# Patient Record
Sex: MALE | Race: BLACK OR AFRICAN AMERICAN | Employment: UNEMPLOYED
[De-identification: names, ages, dates, MRNs, and addresses within clinical notes are randomized per-mention and may not be internally consistent; named-entity substitution may affect disease eponyms.]

---

## 2017-01-16 ENCOUNTER — TELEPHONE (OUTPATIENT)
Dept: PODIATRY | Facility: CLINIC | Age: 52
End: 2017-01-16

## 2017-04-16 ENCOUNTER — HOSPITAL ENCOUNTER (EMERGENCY)
Age: 52
Discharge: HOME | End: 2017-04-16
Attending: EMERGENCY MEDICINE
Payer: COMMERCIAL

## 2017-04-16 ENCOUNTER — APPOINTMENT (OUTPATIENT)
Dept: GENERAL RADIOLOGY | Age: 52
End: 2017-04-16
Payer: COMMERCIAL

## 2017-04-16 VITALS
BODY MASS INDEX: 27.5 KG/M2 | HEIGHT: 72 IN | HEART RATE: 75 BPM | OXYGEN SATURATION: 97 % | DIASTOLIC BLOOD PRESSURE: 82 MMHG | SYSTOLIC BLOOD PRESSURE: 128 MMHG | RESPIRATION RATE: 14 BRPM | WEIGHT: 203 LBS | TEMPERATURE: 97.9 F

## 2017-04-16 DIAGNOSIS — R07.9 CHEST PAIN, UNSPECIFIED TYPE: Primary | ICD-10-CM

## 2017-04-16 LAB
ABSOLUTE EOS #: 0.1 K/UL (ref 0–0.4)
ABSOLUTE LYMPH #: 2.1 K/UL (ref 1–4.8)
ABSOLUTE MONO #: 0.5 K/UL (ref 0.1–1.3)
ANION GAP SERPL CALCULATED.3IONS-SCNC: 11 MMOL/L (ref 9–17)
BASOPHILS # BLD: 1 % (ref 0–2)
BASOPHILS ABSOLUTE: 0.1 K/UL (ref 0–0.2)
BUN BLDV-MCNC: 23 MG/DL (ref 6–20)
BUN/CREAT BLD: ABNORMAL (ref 9–20)
CALCIUM SERPL-MCNC: 8.3 MG/DL (ref 8.6–10.4)
CHLORIDE BLD-SCNC: 104 MMOL/L (ref 98–107)
CO2: 26 MMOL/L (ref 20–31)
CREAT SERPL-MCNC: 0.94 MG/DL (ref 0.7–1.2)
DIFFERENTIAL TYPE: ABNORMAL
EOSINOPHILS RELATIVE PERCENT: 2 % (ref 0–4)
GFR AFRICAN AMERICAN: >60 ML/MIN
GFR NON-AFRICAN AMERICAN: >60 ML/MIN
GFR SERPL CREATININE-BSD FRML MDRD: ABNORMAL ML/MIN/{1.73_M2}
GFR SERPL CREATININE-BSD FRML MDRD: ABNORMAL ML/MIN/{1.73_M2}
GLUCOSE BLD-MCNC: 116 MG/DL (ref 70–99)
HCT VFR BLD CALC: 39.4 % (ref 41–53)
HEMOGLOBIN: 13.1 G/DL (ref 13.5–17.5)
INR BLD: 0.9
LYMPHOCYTES # BLD: 40 % (ref 24–44)
MCH RBC QN AUTO: 29.8 PG (ref 26–34)
MCHC RBC AUTO-ENTMCNC: 33.4 G/DL (ref 31–37)
MCV RBC AUTO: 89.3 FL (ref 80–100)
MONOCYTES # BLD: 10 % (ref 1–7)
PDW BLD-RTO: 14.7 % (ref 11.5–14.9)
PLATELET # BLD: 150 K/UL (ref 150–450)
PLATELET ESTIMATE: ABNORMAL
PMV BLD AUTO: 8.3 FL (ref 6–12)
POTASSIUM SERPL-SCNC: 3.7 MMOL/L (ref 3.7–5.3)
PROTHROMBIN TIME: 9.8 SEC (ref 9.7–12)
RBC # BLD: 4.41 M/UL (ref 4.5–5.9)
RBC # BLD: ABNORMAL 10*6/UL
SEG NEUTROPHILS: 47 % (ref 36–66)
SEGMENTED NEUTROPHILS ABSOLUTE COUNT: 2.5 K/UL (ref 1.3–9.1)
SODIUM BLD-SCNC: 141 MMOL/L (ref 135–144)
TROPONIN INTERP: NORMAL
TROPONIN INTERP: NORMAL
TROPONIN T: <0.03 NG/ML
TROPONIN T: <0.03 NG/ML
WBC # BLD: 5.3 K/UL (ref 3.5–11)
WBC # BLD: ABNORMAL 10*3/UL

## 2017-04-16 PROCEDURE — 93005 ELECTROCARDIOGRAM TRACING: CPT

## 2017-04-16 PROCEDURE — 6370000000 HC RX 637 (ALT 250 FOR IP): Performed by: EMERGENCY MEDICINE

## 2017-04-16 PROCEDURE — 80048 BASIC METABOLIC PNL TOTAL CA: CPT

## 2017-04-16 PROCEDURE — 84484 ASSAY OF TROPONIN QUANT: CPT

## 2017-04-16 PROCEDURE — 85025 COMPLETE CBC W/AUTO DIFF WBC: CPT

## 2017-04-16 PROCEDURE — 71010 XR CHEST PORTABLE: CPT

## 2017-04-16 PROCEDURE — 85610 PROTHROMBIN TIME: CPT

## 2017-04-16 PROCEDURE — 36415 COLL VENOUS BLD VENIPUNCTURE: CPT

## 2017-04-16 PROCEDURE — 99285 EMERGENCY DEPT VISIT HI MDM: CPT

## 2017-04-16 RX ORDER — ASPIRIN 81 MG/1
324 TABLET, CHEWABLE ORAL ONCE
Status: COMPLETED | OUTPATIENT
Start: 2017-04-16 | End: 2017-04-16

## 2017-04-16 RX ADMIN — ASPIRIN 324 MG: 81 TABLET, CHEWABLE ORAL at 05:31

## 2017-04-16 ASSESSMENT — ENCOUNTER SYMPTOMS
DIARRHEA: 0
EYE DISCHARGE: 0
SHORTNESS OF BREATH: 1
COUGH: 0
COLOR CHANGE: 0
NAUSEA: 0
EYE REDNESS: 0
SORE THROAT: 0
VOMITING: 0
RHINORRHEA: 0

## 2017-04-16 ASSESSMENT — PAIN DESCRIPTION - PAIN TYPE: TYPE: ACUTE PAIN

## 2017-04-16 ASSESSMENT — PAIN DESCRIPTION - FREQUENCY: FREQUENCY: INTERMITTENT

## 2017-04-16 ASSESSMENT — PAIN DESCRIPTION - LOCATION: LOCATION: CHEST

## 2017-04-16 ASSESSMENT — PAIN DESCRIPTION - DESCRIPTORS: DESCRIPTORS: TIGHTNESS

## 2017-04-16 ASSESSMENT — PAIN SCALES - GENERAL
PAINLEVEL_OUTOF10: 0
PAINLEVEL_OUTOF10: 8

## 2017-04-18 LAB
EKG ATRIAL RATE: 75 BPM
EKG P AXIS: 57 DEGREES
EKG P-R INTERVAL: 168 MS
EKG Q-T INTERVAL: 378 MS
EKG QRS DURATION: 84 MS
EKG QTC CALCULATION (BAZETT): 422 MS
EKG R AXIS: 16 DEGREES
EKG T AXIS: 49 DEGREES
EKG VENTRICULAR RATE: 75 BPM

## 2017-06-03 ENCOUNTER — APPOINTMENT (OUTPATIENT)
Dept: CT IMAGING | Age: 52
End: 2017-06-03
Payer: COMMERCIAL

## 2017-06-03 ENCOUNTER — HOSPITAL ENCOUNTER (EMERGENCY)
Age: 52
Discharge: HOME | End: 2017-06-03
Attending: EMERGENCY MEDICINE
Payer: COMMERCIAL

## 2017-06-03 VITALS
OXYGEN SATURATION: 96 % | HEART RATE: 109 BPM | RESPIRATION RATE: 22 BRPM | SYSTOLIC BLOOD PRESSURE: 133 MMHG | TEMPERATURE: 98.8 F | BODY MASS INDEX: 27.51 KG/M2 | WEIGHT: 200 LBS | DIASTOLIC BLOOD PRESSURE: 78 MMHG

## 2017-06-03 DIAGNOSIS — M25.512 LEFT SHOULDER PAIN, UNSPECIFIED CHRONICITY: ICD-10-CM

## 2017-06-03 DIAGNOSIS — F10.920 ACUTE ALCOHOLIC INTOXICATION, UNCOMPLICATED (HCC): ICD-10-CM
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
## 2017-06-03 LAB
ETHANOL PERCENT: 0.12 %
ETHANOL: 124 MG/DL

## 2017-06-03 PROCEDURE — 70486 CT MAXILLOFACIAL W/O DYE: CPT

## 2017-06-03 PROCEDURE — 99284 EMERGENCY DEPT VISIT MOD MDM: CPT

## 2017-06-03 PROCEDURE — 70450 CT HEAD/BRAIN W/O DYE: CPT

## 2017-06-03 PROCEDURE — G0480 DRUG TEST DEF 1-7 CLASSES: HCPCS

## 2017-06-03 PROCEDURE — 72125 CT NECK SPINE W/O DYE: CPT

## 2017-06-03 ASSESSMENT — ENCOUNTER SYMPTOMS
VOMITING: 0
CHEST TIGHTNESS: 0
CONSTIPATION: 0
ABDOMINAL PAIN: 0
PHOTOPHOBIA: 0
DIARRHEA: 0
NAUSEA: 0
SHORTNESS OF BREATH: 0
COUGH: 0

## 2017-06-03 ASSESSMENT — PAIN DESCRIPTION - ORIENTATION: ORIENTATION: RIGHT

## 2017-06-03 ASSESSMENT — PAIN SCALES - GENERAL: PAINLEVEL_OUTOF10: 10

## 2017-06-03 ASSESSMENT — PAIN DESCRIPTION - LOCATION: LOCATION: JAW

## 2017-08-01 ENCOUNTER — APPOINTMENT (OUTPATIENT)
Dept: CT IMAGING | Age: 52
End: 2017-08-01
Payer: COMMERCIAL

## 2017-08-01 ENCOUNTER — HOSPITAL ENCOUNTER (OUTPATIENT)
Age: 52
Setting detail: OBSERVATION
Discharge: LEFT BEFORE DISCHARGE | End: 2017-08-01
Attending: EMERGENCY MEDICINE | Admitting: SURGERY
Payer: COMMERCIAL

## 2017-08-01 ENCOUNTER — HOSPITAL ENCOUNTER (OUTPATIENT)
Age: 52
Setting detail: OBSERVATION
Discharge: HOME | End: 2017-08-02
Attending: EMERGENCY MEDICINE | Admitting: EMERGENCY MEDICINE
Payer: COMMERCIAL

## 2017-08-01 ENCOUNTER — APPOINTMENT (OUTPATIENT)
Dept: GENERAL RADIOLOGY | Age: 52
End: 2017-08-01
Payer: COMMERCIAL

## 2017-08-01 VITALS
BODY MASS INDEX: 27.08 KG/M2 | DIASTOLIC BLOOD PRESSURE: 46 MMHG | TEMPERATURE: 98.7 F | RESPIRATION RATE: 15 BRPM | OXYGEN SATURATION: 95 % | HEART RATE: 91 BPM | SYSTOLIC BLOOD PRESSURE: 107 MMHG | WEIGHT: 199.96 LBS | HEIGHT: 72 IN
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
## 2017-08-01 DIAGNOSIS — R45.1 AGITATION: ICD-10-CM

## 2017-08-01 DIAGNOSIS — S01.81XA FACIAL LACERATION, INITIAL ENCOUNTER: Primary | ICD-10-CM

## 2017-08-01 LAB
ABO/RH: NORMAL
ALLEN TEST: ABNORMAL
AMPHETAMINE SCREEN URINE: NEGATIVE
ANION GAP SERPL CALCULATED.3IONS-SCNC: 18 MMOL/L (ref 9–17)
ANTIBODY SCREEN: NEGATIVE
ARM BAND NUMBER: NORMAL
BARBITURATE SCREEN URINE: NEGATIVE
BENZODIAZEPINE SCREEN, URINE: NEGATIVE
BILIRUBIN URINE: NEGATIVE
BLOOD BANK SPECIMEN: ABNORMAL
BUN BLDV-MCNC: 14 MG/DL (ref 6–20)
BUPRENORPHINE URINE: ABNORMAL
CANNABINOID SCREEN URINE: NEGATIVE
CARBOXYHEMOGLOBIN: 5.9 % (ref 0–5)
CHLORIDE BLD-SCNC: 102 MMOL/L (ref 98–107)
CO2: 23 MMOL/L (ref 20–31)
COCAINE METABOLITE, URINE: POSITIVE
COLOR: YELLOW
COMMENT UA: ABNORMAL
CREAT SERPL-MCNC: 1.39 MG/DL (ref 0.7–1.2)
ETHANOL PERCENT: 0.19 %
ETHANOL: 187 MG/DL
EXPIRATION DATE: NORMAL
FIO2: ABNORMAL
GFR AFRICAN AMERICAN: >60 ML/MIN
GFR NON-AFRICAN AMERICAN: 54 ML/MIN
GFR SERPL CREATININE-BSD FRML MDRD: ABNORMAL ML/MIN/{1.73_M2}
GFR SERPL CREATININE-BSD FRML MDRD: ABNORMAL ML/MIN/{1.73_M2}
GLUCOSE BLD-MCNC: 97 MG/DL (ref 70–99)
GLUCOSE URINE: NEGATIVE
HCO3 VENOUS: 22 MMOL/L (ref 24–30)
HCT VFR BLD CALC: 50.4 % (ref 41–53)
HEMOGLOBIN: 16.7 G/DL (ref 13.5–17.5)
INR BLD: 1
KETONES, URINE: ABNORMAL
LEUKOCYTE ESTERASE, URINE: NEGATIVE
MCH RBC QN AUTO: 29.6 PG (ref 26–34)
MCHC RBC AUTO-ENTMCNC: 33.2 G/DL (ref 31–37)
MCV RBC AUTO: 89.1 FL (ref 80–100)
MDMA URINE: ABNORMAL
METHADONE SCREEN, URINE: NEGATIVE
METHAMPHETAMINE, URINE: ABNORMAL
METHEMOGLOBIN: ABNORMAL % (ref 0–1.5)
MODE: ABNORMAL
NEGATIVE BASE EXCESS, VEN: 4.2 MMOL/L (ref 0–2)
NITRITE, URINE: NEGATIVE
NOTIFICATION TIME: ABNORMAL
NOTIFICATION: ABNORMAL
O2 DEVICE/FLOW/%: ABNORMAL
O2 SAT, VEN: 82.2 % (ref 60–85)
OPIATES, URINE: NEGATIVE
OXYCODONE SCREEN URINE: NEGATIVE
OXYHEMOGLOBIN: ABNORMAL % (ref 95–98)
PARTIAL THROMBOPLASTIN TIME: 20.1 SEC (ref 21.3–31.3)
PATIENT TEMP: 37
PCO2, VEN, TEMP ADJ: ABNORMAL MMHG (ref 39–55)
PCO2, VEN: 45.9 (ref 39–55)
PDW BLD-RTO: 13.9 % (ref 12.5–15.4)
PEEP/CPAP: ABNORMAL
PH UA: 5 (ref 5–8)
PH VENOUS: 7.3 (ref 7.32–7.42)
PH, VEN, TEMP ADJ: ABNORMAL (ref 7.32–7.42)
PHENCYCLIDINE, URINE: NEGATIVE
PLATELET # BLD: 207 K/UL (ref 140–450)
PMV BLD AUTO: 8.4 FL (ref 6–12)
PO2, VEN, TEMP ADJ: ABNORMAL MMHG (ref 30–50)
PO2, VEN: 51.6 (ref 30–50)
POSITIVE BASE EXCESS, VEN: ABNORMAL MMOL/L (ref 0–2)
POTASSIUM SERPL-SCNC: 4.3 MMOL/L (ref 3.7–5.3)
PROPOXYPHENE, URINE: ABNORMAL
PROTEIN UA: NEGATIVE
PROTHROMBIN TIME: 11.2 SEC (ref 9.4–12.6)
PSV: ABNORMAL
PT. POSITION: ABNORMAL
RBC # BLD: 5.65 M/UL (ref 4.5–5.9)
RESPIRATORY RATE: ABNORMAL
SAMPLE SITE: ABNORMAL
SET RATE: ABNORMAL
SODIUM BLD-SCNC: 143 MMOL/L (ref 135–144)
SPECIFIC GRAVITY UA: 1.02 (ref 1–1.03)
TEST INFORMATION: ABNORMAL
TEXT FOR RESPIRATORY: ABNORMAL
TOTAL HB: ABNORMAL G/DL (ref 12–16)
TOTAL RATE: ABNORMAL
TRICYCLIC ANTIDEPRESSANTS, UR: ABNORMAL
TURBIDITY: CLEAR
URINE HGB: NEGATIVE
UROBILINOGEN, URINE: NORMAL
VT: ABNORMAL
WBC # BLD: 7 K/UL (ref 3.5–11)

## 2017-08-01 PROCEDURE — 6360000002 HC RX W HCPCS: Performed by: EMERGENCY MEDICINE

## 2017-08-01 PROCEDURE — 80051 ELECTROLYTE PANEL: CPT

## 2017-08-01 PROCEDURE — 86901 BLOOD TYPING SEROLOGIC RH(D): CPT

## 2017-08-01 PROCEDURE — 85730 THROMBOPLASTIN TIME PARTIAL: CPT

## 2017-08-01 PROCEDURE — 96374 THER/PROPH/DIAG INJ IV PUSH: CPT

## 2017-08-01 PROCEDURE — 85610 PROTHROMBIN TIME: CPT

## 2017-08-01 PROCEDURE — 81003 URINALYSIS AUTO W/O SCOPE: CPT

## 2017-08-01 PROCEDURE — 86850 RBC ANTIBODY SCREEN: CPT

## 2017-08-01 PROCEDURE — G0378 HOSPITAL OBSERVATION PER HR: HCPCS

## 2017-08-01 PROCEDURE — 80307 DRUG TEST PRSMV CHEM ANLYZR: CPT

## 2017-08-01 PROCEDURE — 86900 BLOOD TYPING SEROLOGIC ABO: CPT

## 2017-08-01 PROCEDURE — 99285 EMERGENCY DEPT VISIT HI MDM: CPT

## 2017-08-01 PROCEDURE — 70450 CT HEAD/BRAIN W/O DYE: CPT

## 2017-08-01 PROCEDURE — 71010 XR CHEST PORTABLE: CPT

## 2017-08-01 PROCEDURE — 6360000002 HC RX W HCPCS

## 2017-08-01 PROCEDURE — 85027 COMPLETE CBC AUTOMATED: CPT

## 2017-08-01 PROCEDURE — 6370000000 HC RX 637 (ALT 250 FOR IP): Performed by: STUDENT IN AN ORGANIZED HEALTH CARE EDUCATION/TRAINING PROGRAM

## 2017-08-01 PROCEDURE — 72125 CT NECK SPINE W/O DYE: CPT

## 2017-08-01 PROCEDURE — 82805 BLOOD GASES W/O2 SATURATION: CPT

## 2017-08-01 PROCEDURE — G0480 DRUG TEST DEF 1-7 CLASSES: HCPCS

## 2017-08-01 PROCEDURE — 84520 ASSAY OF UREA NITROGEN: CPT

## 2017-08-01 PROCEDURE — 82565 ASSAY OF CREATININE: CPT

## 2017-08-01 PROCEDURE — 82947 ASSAY GLUCOSE BLOOD QUANT: CPT

## 2017-08-01 PROCEDURE — 99284 EMERGENCY DEPT VISIT MOD MDM: CPT

## 2017-08-01 RX ORDER — ACETAMINOPHEN 325 MG/1
650 TABLET ORAL EVERY 4 HOURS PRN
Status: DISCONTINUED | OUTPATIENT
Start: 2017-08-01 | End: 2017-08-01 | Stop reason: HOSPADM

## 2017-08-01 RX ORDER — OXYCODONE HYDROCHLORIDE AND ACETAMINOPHEN 5; 325 MG/1; MG/1
1 TABLET ORAL EVERY 4 HOURS PRN
Status: DISCONTINUED | OUTPATIENT
Start: 2017-08-01 | End: 2017-08-01 | Stop reason: HOSPADM

## 2017-08-01 RX ORDER — KETAMINE HYDROCHLORIDE 10 MG/ML
INJECTION, SOLUTION INTRAMUSCULAR; INTRAVENOUS
Status: DISCONTINUED
Start: 2017-08-01 | End: 2017-08-01 | Stop reason: HOSPADM

## 2017-08-01 RX ORDER — CITALOPRAM 20 MG/1
20 TABLET ORAL
COMMUNITY
Start: 2016-12-26 | End: 2020-03-13

## 2017-08-01 RX ORDER — ONDANSETRON 2 MG/ML
4 INJECTION INTRAMUSCULAR; INTRAVENOUS EVERY 6 HOURS PRN
Status: CANCELLED | OUTPATIENT
Start: 2017-08-01

## 2017-08-01 RX ORDER — SODIUM CHLORIDE 0.9 % (FLUSH) 0.9 %
10 SYRINGE (ML) INJECTION PRN
Status: CANCELLED | OUTPATIENT
Start: 2017-08-01

## 2017-08-01 RX ORDER — ONDANSETRON 2 MG/ML
4 INJECTION INTRAMUSCULAR; INTRAVENOUS ONCE
Status: COMPLETED | OUTPATIENT
Start: 2017-08-01 | End: 2017-08-01

## 2017-08-01 RX ORDER — TRAZODONE HYDROCHLORIDE 50 MG/1
50 TABLET ORAL
COMMUNITY
End: 2020-03-13

## 2017-08-01 RX ORDER — SODIUM CHLORIDE 0.9 % (FLUSH) 0.9 %
10 SYRINGE (ML) INJECTION EVERY 12 HOURS SCHEDULED
Status: DISCONTINUED | OUTPATIENT
Start: 2017-08-01 | End: 2017-08-01 | Stop reason: HOSPADM

## 2017-08-01 RX ORDER — ONDANSETRON 2 MG/ML
4 INJECTION INTRAMUSCULAR; INTRAVENOUS EVERY 6 HOURS PRN
Status: DISCONTINUED | OUTPATIENT
Start: 2017-08-01 | End: 2017-08-01 | Stop reason: HOSPADM

## 2017-08-01 RX ORDER — MORPHINE SULFATE 4 MG/ML
4 INJECTION, SOLUTION INTRAMUSCULAR; INTRAVENOUS EVERY 4 HOURS PRN
Status: CANCELLED | OUTPATIENT
Start: 2017-08-01

## 2017-08-01 RX ORDER — MORPHINE SULFATE 2 MG/ML
2 INJECTION, SOLUTION INTRAMUSCULAR; INTRAVENOUS EVERY 4 HOURS PRN
Status: CANCELLED | OUTPATIENT
Start: 2017-08-01

## 2017-08-01 RX ORDER — ACETAMINOPHEN 325 MG/1
650 TABLET ORAL EVERY 4 HOURS PRN
Status: CANCELLED | OUTPATIENT
Start: 2017-08-01

## 2017-08-01 RX ORDER — MORPHINE SULFATE 2 MG/ML
2 INJECTION, SOLUTION INTRAMUSCULAR; INTRAVENOUS
Status: DISCONTINUED | OUTPATIENT
Start: 2017-08-01 | End: 2017-08-01 | Stop reason: HOSPADM

## 2017-08-01 RX ORDER — SODIUM CHLORIDE 0.9 % (FLUSH) 0.9 %
10 SYRINGE (ML) INJECTION PRN
Status: DISCONTINUED | OUTPATIENT
Start: 2017-08-01 | End: 2017-08-01 | Stop reason: HOSPADM

## 2017-08-01 RX ORDER — SODIUM CHLORIDE 0.9 % (FLUSH) 0.9 %
10 SYRINGE (ML) INJECTION EVERY 12 HOURS SCHEDULED
Status: CANCELLED | OUTPATIENT
Start: 2017-08-02

## 2017-08-01 RX ORDER — ONDANSETRON 2 MG/ML
INJECTION INTRAMUSCULAR; INTRAVENOUS
Status: COMPLETED
Start: 2017-08-01 | End: 2017-08-01

## 2017-08-01 RX ADMIN — ONDANSETRON 4 MG: 2 INJECTION INTRAMUSCULAR; INTRAVENOUS at 23:18

## 2017-08-01 RX ADMIN — ONDANSETRON 4 MG: 2 INJECTION INTRAMUSCULAR; INTRAVENOUS at 14:55

## 2017-08-01 RX ADMIN — OXYCODONE HYDROCHLORIDE AND ACETAMINOPHEN 1 TABLET: 5; 325 TABLET ORAL at 20:19

## 2017-08-01 RX ADMIN — OXYCODONE HYDROCHLORIDE AND ACETAMINOPHEN 1 TABLET: 5; 325 TABLET ORAL at 16:23

## 2017-08-01 ASSESSMENT — PAIN SCALES - GENERAL
PAINLEVEL_OUTOF10: 10
PAINLEVEL_OUTOF10: 9

## 2017-08-01 ASSESSMENT — VISUAL ACUITY
OS: 20/50
OU: 20/50
OD: 20/50

## 2017-08-02 ENCOUNTER — APPOINTMENT (OUTPATIENT)
Dept: GENERAL RADIOLOGY | Age: 52
End: 2017-08-02
Payer: COMMERCIAL

## 2017-08-02 VITALS
SYSTOLIC BLOOD PRESSURE: 125 MMHG | OXYGEN SATURATION: 95 % | TEMPERATURE: 97.7 F | HEIGHT: 72 IN | DIASTOLIC BLOOD PRESSURE: 66 MMHG | RESPIRATION RATE: 18 BRPM | HEART RATE: 78 BPM | WEIGHT: 199.96 LBS | BODY MASS INDEX: 27.08 KG/M2

## 2017-08-02 PROCEDURE — 2500000003 HC RX 250 WO HCPCS: Performed by: STUDENT IN AN ORGANIZED HEALTH CARE EDUCATION/TRAINING PROGRAM

## 2017-08-02 PROCEDURE — S0028 INJECTION, FAMOTIDINE, 20 MG: HCPCS | Performed by: STUDENT IN AN ORGANIZED HEALTH CARE EDUCATION/TRAINING PROGRAM

## 2017-08-02 PROCEDURE — 72040 X-RAY EXAM NECK SPINE 2-3 VW: CPT

## 2017-08-02 PROCEDURE — 6360000002 HC RX W HCPCS: Performed by: EMERGENCY MEDICINE

## 2017-08-02 PROCEDURE — 6370000000 HC RX 637 (ALT 250 FOR IP): Performed by: EMERGENCY MEDICINE

## 2017-08-02 PROCEDURE — 96375 TX/PRO/DX INJ NEW DRUG ADDON: CPT

## 2017-08-02 PROCEDURE — 2580000003 HC RX 258: Performed by: EMERGENCY MEDICINE

## 2017-08-02 PROCEDURE — 96376 TX/PRO/DX INJ SAME DRUG ADON: CPT

## 2017-08-02 PROCEDURE — 96374 THER/PROPH/DIAG INJ IV PUSH: CPT

## 2017-08-02 PROCEDURE — G0378 HOSPITAL OBSERVATION PER HR: HCPCS

## 2017-08-02 RX ORDER — HYDROCODONE BITARTRATE AND ACETAMINOPHEN 5; 325 MG/1; MG/1
TABLET ORAL
Qty: 40 TABLET | Refills: 0 | Status: SHIPPED | OUTPATIENT
Start: 2017-08-02 | End: 2019-06-21 | Stop reason: ALTCHOICE

## 2017-08-02 RX ORDER — SODIUM CHLORIDE 0.9 % (FLUSH) 0.9 %
10 SYRINGE (ML) INJECTION PRN
Status: DISCONTINUED | OUTPATIENT
Start: 2017-08-02 | End: 2017-08-02 | Stop reason: HOSPADM

## 2017-08-02 RX ORDER — ONDANSETRON 4 MG/1
4 TABLET, ORALLY DISINTEGRATING ORAL EVERY 8 HOURS PRN
Qty: 8 TABLET | Refills: 0 | Status: SHIPPED | OUTPATIENT
Start: 2017-08-02 | End: 2020-03-13

## 2017-08-02 RX ORDER — ACETAMINOPHEN 325 MG/1
650 TABLET ORAL EVERY 4 HOURS PRN
Status: DISCONTINUED | OUTPATIENT
Start: 2017-08-02 | End: 2017-08-02 | Stop reason: HOSPADM

## 2017-08-02 RX ORDER — ONDANSETRON 2 MG/ML
4 INJECTION INTRAMUSCULAR; INTRAVENOUS EVERY 8 HOURS PRN
Status: DISCONTINUED | OUTPATIENT
Start: 2017-08-02 | End: 2017-08-02 | Stop reason: HOSPADM

## 2017-08-02 RX ORDER — CITALOPRAM 20 MG/1
20 TABLET ORAL DAILY
Status: DISCONTINUED | OUTPATIENT
Start: 2017-08-02 | End: 2017-08-02 | Stop reason: HOSPADM

## 2017-08-02 RX ORDER — HYDROCODONE BITARTRATE AND ACETAMINOPHEN 5; 325 MG/1; MG/1
2 TABLET ORAL EVERY 4 HOURS PRN
Status: DISCONTINUED | OUTPATIENT
Start: 2017-08-02 | End: 2017-08-02 | Stop reason: HOSPADM

## 2017-08-02 RX ORDER — MORPHINE SULFATE 4 MG/ML
4 INJECTION, SOLUTION INTRAMUSCULAR; INTRAVENOUS EVERY 6 HOURS PRN
Status: DISCONTINUED | OUTPATIENT
Start: 2017-08-02 | End: 2017-08-02

## 2017-08-02 RX ORDER — TRAZODONE HYDROCHLORIDE 50 MG/1
50 TABLET ORAL NIGHTLY
Status: DISCONTINUED | OUTPATIENT
Start: 2017-08-02 | End: 2017-08-02 | Stop reason: HOSPADM

## 2017-08-02 RX ORDER — DOCUSATE SODIUM 100 MG/1
100 CAPSULE, LIQUID FILLED ORAL 2 TIMES DAILY PRN
Qty: 30 CAPSULE | Refills: 0 | Status: SHIPPED | OUTPATIENT
Start: 2017-08-02 | End: 2020-03-13

## 2017-08-02 RX ORDER — HYDROCODONE BITARTRATE AND ACETAMINOPHEN 5; 325 MG/1; MG/1
1 TABLET ORAL EVERY 4 HOURS PRN
Status: DISCONTINUED | OUTPATIENT
Start: 2017-08-02 | End: 2017-08-02 | Stop reason: HOSPADM

## 2017-08-02 RX ORDER — MORPHINE SULFATE 4 MG/ML
4 INJECTION, SOLUTION INTRAMUSCULAR; INTRAVENOUS ONCE
Status: COMPLETED | OUTPATIENT
Start: 2017-08-02 | End: 2017-08-02

## 2017-08-02 RX ORDER — SODIUM CHLORIDE 0.9 % (FLUSH) 0.9 %
10 SYRINGE (ML) INJECTION EVERY 12 HOURS SCHEDULED
Status: DISCONTINUED | OUTPATIENT
Start: 2017-08-02 | End: 2017-08-02 | Stop reason: HOSPADM

## 2017-08-02 RX ADMIN — HYDROCODONE BITARTRATE AND ACETAMINOPHEN 2 TABLET: 5; 325 TABLET ORAL at 10:04

## 2017-08-02 RX ADMIN — FAMOTIDINE 20 MG: 10 INJECTION, SOLUTION INTRAVENOUS at 08:24

## 2017-08-02 RX ADMIN — MORPHINE SULFATE 4 MG: 4 INJECTION, SOLUTION INTRAMUSCULAR; INTRAVENOUS at 00:23

## 2017-08-02 RX ADMIN — FAMOTIDINE 20 MG: 10 INJECTION, SOLUTION INTRAVENOUS at 00:21

## 2017-08-02 RX ADMIN — HYDROCODONE BITARTRATE AND ACETAMINOPHEN 2 TABLET: 5; 325 TABLET ORAL at 01:54

## 2017-08-02 RX ADMIN — Medication 10 ML: at 08:26

## 2017-08-02 RX ADMIN — MORPHINE SULFATE 4 MG: 4 INJECTION, SOLUTION INTRAMUSCULAR; INTRAVENOUS at 05:04

## 2017-08-02 ASSESSMENT — ENCOUNTER SYMPTOMS
NAUSEA: 0
SHORTNESS OF BREATH: 0
VOMITING: 0
ABDOMINAL PAIN: 0
BACK PAIN: 0

## 2017-08-02 ASSESSMENT — PAIN DESCRIPTION - ONSET: ONSET: ON-GOING

## 2017-08-02 ASSESSMENT — PAIN DESCRIPTION - PROGRESSION: CLINICAL_PROGRESSION: NOT CHANGED

## 2017-08-02 ASSESSMENT — PAIN DESCRIPTION - PAIN TYPE
TYPE: ACUTE PAIN
TYPE: ACUTE PAIN

## 2017-08-02 ASSESSMENT — PAIN DESCRIPTION - LOCATION
LOCATION: FACE
LOCATION: FACE

## 2017-08-02 ASSESSMENT — PAIN SCALES - GENERAL
PAINLEVEL_OUTOF10: 9
PAINLEVEL_OUTOF10: 10

## 2017-08-02 ASSESSMENT — PAIN DESCRIPTION - ORIENTATION
ORIENTATION: MID
ORIENTATION: OTHER (COMMENT)

## 2017-08-02 ASSESSMENT — PAIN DESCRIPTION - DESCRIPTORS: DESCRIPTORS: ACHING;PRESSURE

## 2017-08-02 ASSESSMENT — PAIN DESCRIPTION - FREQUENCY: FREQUENCY: CONTINUOUS

## 2017-08-11 ENCOUNTER — HOSPITAL ENCOUNTER (EMERGENCY)
Age: 52
Discharge: HOME | End: 2017-08-11
Attending: EMERGENCY MEDICINE
Payer: COMMERCIAL

## 2017-08-11 VITALS
DIASTOLIC BLOOD PRESSURE: 73 MMHG | HEART RATE: 74 BPM | OXYGEN SATURATION: 100 % | SYSTOLIC BLOOD PRESSURE: 122 MMHG | RESPIRATION RATE: 14 BRPM | TEMPERATURE: 98.5 F

## 2017-08-11 DIAGNOSIS — Z48.02 VISIT FOR SUTURE REMOVAL: Primary | ICD-10-CM

## 2017-08-11 PROCEDURE — G0380 LEV 1 HOSP TYPE B ED VISIT: HCPCS

## 2017-08-11 ASSESSMENT — ENCOUNTER SYMPTOMS
VOMITING: 0
NAUSEA: 0
SHORTNESS OF BREATH: 0
COUGH: 0
TROUBLE SWALLOWING: 0
WHEEZING: 0

## 2019-02-06 ENCOUNTER — OFFICE VISIT (OUTPATIENT)
Dept: FAMILY MEDICINE CLINIC | Age: 54
End: 2019-02-06
Payer: COMMERCIAL

## 2019-02-06 VITALS — BODY MASS INDEX: 31.91 KG/M2 | WEIGHT: 233 LBS

## 2019-02-06 DIAGNOSIS — E78.5 DYSLIPIDEMIA: ICD-10-CM

## 2019-02-06 DIAGNOSIS — F31.9 BIPOLAR 1 DISORDER (HCC): ICD-10-CM

## 2019-02-06 DIAGNOSIS — Z13.1 DIABETES MELLITUS SCREENING: ICD-10-CM

## 2019-02-06 DIAGNOSIS — Z23 NEED FOR SHINGLES VACCINE: ICD-10-CM

## 2019-02-06 DIAGNOSIS — M51.26 LUMBAR HERNIATED DISC: Primary | ICD-10-CM

## 2019-02-06 DIAGNOSIS — Z23 NEED FOR TDAP VACCINATION: ICD-10-CM

## 2019-02-06 LAB — HBA1C MFR BLD: 5.9 %

## 2019-02-06 PROCEDURE — 99213 OFFICE O/P EST LOW 20 MIN: CPT | Performed by: STUDENT IN AN ORGANIZED HEALTH CARE EDUCATION/TRAINING PROGRAM

## 2019-02-06 PROCEDURE — 99211 OFF/OP EST MAY X REQ PHY/QHP: CPT | Performed by: STUDENT IN AN ORGANIZED HEALTH CARE EDUCATION/TRAINING PROGRAM

## 2019-02-06 PROCEDURE — 4004F PT TOBACCO SCREEN RCVD TLK: CPT | Performed by: FAMILY MEDICINE

## 2019-02-06 PROCEDURE — G8417 CALC BMI ABV UP PARAM F/U: HCPCS | Performed by: FAMILY MEDICINE

## 2019-02-06 PROCEDURE — 90715 TDAP VACCINE 7 YRS/> IM: CPT | Performed by: STUDENT IN AN ORGANIZED HEALTH CARE EDUCATION/TRAINING PROGRAM

## 2019-02-06 PROCEDURE — G8427 DOCREV CUR MEDS BY ELIG CLIN: HCPCS | Performed by: FAMILY MEDICINE

## 2019-02-06 PROCEDURE — 3017F COLORECTAL CA SCREEN DOC REV: CPT | Performed by: FAMILY MEDICINE

## 2019-02-06 PROCEDURE — 83036 HEMOGLOBIN GLYCOSYLATED A1C: CPT | Performed by: STUDENT IN AN ORGANIZED HEALTH CARE EDUCATION/TRAINING PROGRAM

## 2019-02-06 PROCEDURE — G8484 FLU IMMUNIZE NO ADMIN: HCPCS | Performed by: FAMILY MEDICINE

## 2019-02-06 RX ORDER — SIMVASTATIN 20 MG
20 TABLET ORAL NIGHTLY
Qty: 30 TABLET | Refills: 5 | Status: SHIPPED | OUTPATIENT
Start: 2019-02-06 | End: 2020-03-13

## 2019-02-06 RX ORDER — RISPERIDONE 1 MG/1
1 TABLET, FILM COATED ORAL DAILY
Qty: 60 TABLET | Refills: 3 | Status: SHIPPED | OUTPATIENT
Start: 2019-02-06 | End: 2020-03-13

## 2019-02-06 RX ORDER — CARBAMAZEPINE 200 MG/1
300 TABLET ORAL DAILY
Qty: 90 TABLET | Refills: 3 | Status: SHIPPED | OUTPATIENT
Start: 2019-02-06 | End: 2020-03-13

## 2019-02-06 RX ORDER — IBUPROFEN 800 MG/1
800 TABLET ORAL 2 TIMES DAILY PRN
Qty: 60 TABLET | Refills: 1 | Status: SHIPPED | OUTPATIENT
Start: 2019-02-06 | End: 2020-03-13

## 2019-02-06 RX ORDER — TIZANIDINE 4 MG/1
4 TABLET ORAL 3 TIMES DAILY
Qty: 30 TABLET | Refills: 1 | Status: SHIPPED | OUTPATIENT
Start: 2019-02-06 | End: 2019-03-19 | Stop reason: SDUPTHER

## 2019-02-06 ASSESSMENT — ENCOUNTER SYMPTOMS
ABDOMINAL DISTENTION: 0
EYE DISCHARGE: 0
SORE THROAT: 0
EYE PAIN: 0
APNEA: 0
CHEST TIGHTNESS: 0
ABDOMINAL PAIN: 0
COUGH: 0
RHINORRHEA: 0
SHORTNESS OF BREATH: 0

## 2019-02-08 ENCOUNTER — HOSPITAL ENCOUNTER (OUTPATIENT)
Age: 54
Discharge: HOME | End: 2019-02-08
Payer: COMMERCIAL

## 2019-02-08 DIAGNOSIS — E78.5 DYSLIPIDEMIA: ICD-10-CM

## 2019-02-08 LAB
CHOLESTEROL/HDL RATIO: 5.6
CHOLESTEROL: 248 MG/DL
HDLC SERPL-MCNC: 44 MG/DL
LDL CHOLESTEROL: 162 MG/DL (ref 0–130)
TRIGL SERPL-MCNC: 211 MG/DL
VLDLC SERPL CALC-MCNC: ABNORMAL MG/DL (ref 1–30)

## 2019-02-08 PROCEDURE — 80061 LIPID PANEL: CPT

## 2019-02-08 PROCEDURE — 36415 COLL VENOUS BLD VENIPUNCTURE: CPT

## 2019-02-14 ENCOUNTER — HOSPITAL ENCOUNTER (OUTPATIENT)
Dept: PAIN MANAGEMENT | Age: 54
Discharge: HOME | End: 2019-02-14
Payer: COMMERCIAL

## 2019-02-14 VITALS
TEMPERATURE: 97.4 F | BODY MASS INDEX: 31.56 KG/M2 | HEART RATE: 88 BPM | SYSTOLIC BLOOD PRESSURE: 114 MMHG | DIASTOLIC BLOOD PRESSURE: 66 MMHG | RESPIRATION RATE: 16 BRPM | HEIGHT: 72 IN | WEIGHT: 233 LBS

## 2019-02-14 DIAGNOSIS — M54.16 LUMBAR RADICULOPATHY: Primary | ICD-10-CM

## 2019-02-14 PROCEDURE — 99203 OFFICE O/P NEW LOW 30 MIN: CPT

## 2019-02-14 PROCEDURE — 99244 OFF/OP CNSLTJ NEW/EST MOD 40: CPT | Performed by: PAIN MEDICINE

## 2019-02-14 ASSESSMENT — ENCOUNTER SYMPTOMS
COUGH: 0
EYE PAIN: 0
BOWEL INCONTINENCE: 0
SNORING: 0
BACK PAIN: 1
ABDOMINAL PAIN: 0

## 2019-02-28 ENCOUNTER — HOSPITAL ENCOUNTER (OUTPATIENT)
Dept: PHYSICAL THERAPY | Age: 54
Setting detail: THERAPIES SERIES
Discharge: HOME | End: 2019-02-28
Payer: COMMERCIAL

## 2019-02-28 PROCEDURE — 97161 PT EVAL LOW COMPLEX 20 MIN: CPT

## 2019-02-28 PROCEDURE — 97110 THERAPEUTIC EXERCISES: CPT

## 2019-02-28 PROCEDURE — 97530 THERAPEUTIC ACTIVITIES: CPT

## 2019-03-05 ENCOUNTER — HOSPITAL ENCOUNTER (OUTPATIENT)
Dept: PHYSICAL THERAPY | Age: 54
Setting detail: THERAPIES SERIES
Discharge: HOME | End: 2019-03-05
Payer: COMMERCIAL

## 2019-03-05 PROCEDURE — 97110 THERAPEUTIC EXERCISES: CPT

## 2019-03-07 ENCOUNTER — HOSPITAL ENCOUNTER (OUTPATIENT)
Dept: PHYSICAL THERAPY | Age: 54
Setting detail: THERAPIES SERIES
Discharge: HOME | End: 2019-03-07
Payer: COMMERCIAL

## 2019-03-11 ENCOUNTER — HOSPITAL ENCOUNTER (EMERGENCY)
Age: 54
Discharge: HOME | End: 2019-03-11
Attending: EMERGENCY MEDICINE
Payer: COMMERCIAL

## 2019-03-11 VITALS
OXYGEN SATURATION: 95 % | HEIGHT: 71 IN | TEMPERATURE: 98.9 F | WEIGHT: 230 LBS | DIASTOLIC BLOOD PRESSURE: 94 MMHG | HEART RATE: 97 BPM | SYSTOLIC BLOOD PRESSURE: 141 MMHG | BODY MASS INDEX: 32.2 KG/M2 | RESPIRATION RATE: 20 BRPM

## 2019-03-11 DIAGNOSIS — M25.562 ACUTE PAIN OF LEFT KNEE: ICD-10-CM

## 2019-03-11 DIAGNOSIS — M43.6 NECK STIFFNESS: ICD-10-CM
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
## 2019-03-11 PROCEDURE — G0382 LEV 3 HOSP TYPE B ED VISIT: HCPCS

## 2019-03-11 PROCEDURE — 6370000000 HC RX 637 (ALT 250 FOR IP): Performed by: PHYSICIAN ASSISTANT

## 2019-03-11 RX ORDER — ACETAMINOPHEN 500 MG
1000 TABLET ORAL ONCE
Status: COMPLETED | OUTPATIENT
Start: 2019-03-11 | End: 2019-03-11

## 2019-03-11 RX ORDER — ACETAMINOPHEN 325 MG/1
650 TABLET ORAL EVERY 6 HOURS PRN
Qty: 30 TABLET | Refills: 0 | Status: SHIPPED | OUTPATIENT
Start: 2019-03-11 | End: 2019-06-21 | Stop reason: ALTCHOICE

## 2019-03-11 RX ADMIN — ACETAMINOPHEN 1000 MG: 500 TABLET ORAL at 21:06

## 2019-03-11 ASSESSMENT — ENCOUNTER SYMPTOMS
EYE ITCHING: 0
BACK PAIN: 0
RHINORRHEA: 0
NAUSEA: 0
VOMITING: 0
COUGH: 0
SHORTNESS OF BREATH: 0
EYE PAIN: 0
SORE THROAT: 0
EYE DISCHARGE: 0
WHEEZING: 0

## 2019-03-11 ASSESSMENT — PAIN DESCRIPTION - LOCATION: LOCATION: HEAD;KNEE

## 2019-03-11 ASSESSMENT — PAIN SCALES - GENERAL: PAINLEVEL_OUTOF10: 8

## 2019-03-11 ASSESSMENT — PAIN DESCRIPTION - DESCRIPTORS: DESCRIPTORS: TIGHTNESS

## 2019-03-12 ENCOUNTER — HOSPITAL ENCOUNTER (OUTPATIENT)
Dept: PHYSICAL THERAPY | Age: 54
Setting detail: THERAPIES SERIES
Discharge: HOME | End: 2019-03-12
Payer: COMMERCIAL

## 2019-03-12 PROCEDURE — 97110 THERAPEUTIC EXERCISES: CPT

## 2019-03-14 ENCOUNTER — HOSPITAL ENCOUNTER (OUTPATIENT)
Dept: PHYSICAL THERAPY | Age: 54
Setting detail: THERAPIES SERIES
Discharge: HOME | End: 2019-03-14
Payer: COMMERCIAL

## 2019-03-14 PROCEDURE — 97110 THERAPEUTIC EXERCISES: CPT

## 2019-03-18 ENCOUNTER — OFFICE VISIT (OUTPATIENT)
Dept: FAMILY MEDICINE CLINIC | Age: 54
End: 2019-03-18
Payer: OTHER MISCELLANEOUS

## 2019-03-18 VITALS
HEIGHT: 71 IN | BODY MASS INDEX: 31.33 KG/M2 | DIASTOLIC BLOOD PRESSURE: 85 MMHG | SYSTOLIC BLOOD PRESSURE: 128 MMHG | WEIGHT: 223.8 LBS | HEART RATE: 101 BPM | TEMPERATURE: 98.3 F

## 2019-03-18 DIAGNOSIS — G89.29 CHRONIC LOW BACK PAIN, UNSPECIFIED BACK PAIN LATERALITY, WITH SCIATICA PRESENCE UNSPECIFIED: Primary | ICD-10-CM

## 2019-03-18 DIAGNOSIS — Z71.6 ENCOUNTER FOR SMOKING CESSATION COUNSELING: ICD-10-CM

## 2019-03-18 DIAGNOSIS — M54.5 CHRONIC LOW BACK PAIN, UNSPECIFIED BACK PAIN LATERALITY, WITH SCIATICA PRESENCE UNSPECIFIED: Primary | ICD-10-CM

## 2019-03-18 DIAGNOSIS — M25.562 ACUTE PAIN OF LEFT KNEE: ICD-10-CM

## 2019-03-18 PROBLEM — M54.50 CHRONIC LOW BACK PAIN: Status: ACTIVE | Noted: 2019-03-18

## 2019-03-18 PROCEDURE — G8427 DOCREV CUR MEDS BY ELIG CLIN: HCPCS | Performed by: FAMILY MEDICINE

## 2019-03-18 PROCEDURE — 1036F TOBACCO NON-USER: CPT | Performed by: FAMILY MEDICINE

## 2019-03-18 PROCEDURE — 99213 OFFICE O/P EST LOW 20 MIN: CPT | Performed by: STUDENT IN AN ORGANIZED HEALTH CARE EDUCATION/TRAINING PROGRAM

## 2019-03-18 PROCEDURE — G8484 FLU IMMUNIZE NO ADMIN: HCPCS | Performed by: FAMILY MEDICINE

## 2019-03-18 PROCEDURE — G8417 CALC BMI ABV UP PARAM F/U: HCPCS | Performed by: FAMILY MEDICINE

## 2019-03-18 PROCEDURE — 3017F COLORECTAL CA SCREEN DOC REV: CPT | Performed by: FAMILY MEDICINE

## 2019-03-18 RX ORDER — NICOTINE 21 MG/24HR
1 PATCH, TRANSDERMAL 24 HOURS TRANSDERMAL EVERY 24 HOURS
Qty: 30 PATCH | Refills: 3 | Status: SHIPPED | OUTPATIENT
Start: 2019-03-18 | End: 2019-06-21 | Stop reason: ALTCHOICE

## 2019-03-18 ASSESSMENT — ENCOUNTER SYMPTOMS
VOMITING: 0
BACK PAIN: 1
WHEEZING: 0
DIARRHEA: 0
ABDOMINAL PAIN: 0
COUGH: 0
ANAL BLEEDING: 0
SHORTNESS OF BREATH: 0
NAUSEA: 0
CONSTIPATION: 0

## 2019-03-19 ENCOUNTER — HOSPITAL ENCOUNTER (OUTPATIENT)
Dept: PHYSICAL THERAPY | Age: 54
Setting detail: THERAPIES SERIES
Discharge: HOME | End: 2019-03-19
Payer: COMMERCIAL

## 2019-03-19 DIAGNOSIS — M51.26 LUMBAR HERNIATED DISC: ICD-10-CM

## 2019-03-19 PROCEDURE — 97110 THERAPEUTIC EXERCISES: CPT

## 2019-03-21 RX ORDER — TIZANIDINE 4 MG/1
TABLET ORAL
Qty: 30 TABLET | Refills: 1 | Status: SHIPPED | OUTPATIENT
Start: 2019-03-21 | End: 2019-04-20 | Stop reason: SDUPTHER

## 2019-03-26 ENCOUNTER — HOSPITAL ENCOUNTER (OUTPATIENT)
Dept: PHYSICAL THERAPY | Age: 54
Setting detail: THERAPIES SERIES
Discharge: HOME | End: 2019-03-26
Payer: COMMERCIAL

## 2019-03-26 PROCEDURE — 97110 THERAPEUTIC EXERCISES: CPT

## 2019-03-28 ENCOUNTER — APPOINTMENT (OUTPATIENT)
Dept: PHYSICAL THERAPY | Age: 54
End: 2019-03-28
Payer: COMMERCIAL

## 2019-04-12 ENCOUNTER — HOSPITAL ENCOUNTER (OUTPATIENT)
Dept: PHYSICAL THERAPY | Age: 54
Setting detail: THERAPIES SERIES
Discharge: HOME | End: 2019-04-12
Payer: COMMERCIAL

## 2019-04-12 PROCEDURE — 97110 THERAPEUTIC EXERCISES: CPT

## 2019-04-12 NOTE — FLOWSHEET NOTE
[x] [facility] [facility]  [address]  [phone]  F: [phone]        Physical Therapy Daily Treatment Note    Date:  2019  Patient Name:  Arpita Lazar    :  1965  MRN: [id number]  Physician: Arpita Lazar                      : 1965                        MRN: [id number]  Physician: Artemio Hale MD               Insurance: [facility]  Medical Diagnosis: M54.16 (ICD-10-CM) - Lumbar radiculopathy                Rehab Codes: pain M54.5, M54.17, posture R29.3,   Onset Date: May 16, 2018                Next 's appt.:        Visit# / total visits:   Cancels/No Shows: 2/0     Subjective:    Pain:  [x] Yes  [] No Location: low back  Pain Rating: (0-10 scale) 5/10  Pain altered Tx:  [x] No  [] Yes  Action:  Comments: Patient arrived with report of low back pain that shoots down the back of his buttocks. Noted he just drove 7 hrs today to get  her    Objective:     Modalities: Hot pack to low back-not today  Precautions:  Exercises:  Exercise Reps/ Time Weight/ Level Comments   SciFit 5 min Level 2    Seated:      Hamstring stretches 3 ea 15 sec stool   Piriformis stretches 3 ea 15 sec Figure 4 with forward lean   Supine:      Piriformis stretch  15 sec Figure 4-knee to opposite shoulder   Isometric abdominals 10  5 sec    Alt bent leg raises 10 2 lb    Alt arm raises 10 2 lb    Alt arm & leg raises 10 2 lb/2 lb Same side   Prone: On 1 pillow   Prone lying ---     Prone prop on elbows 3 min     Prone press ups * 10     Gluteal sets * 15 5 sec    Alt arm raises 15 2 lb    Alt leg raises * 15 2 lb    Alt arm and leg raises * 10 2 lb/2 lb Opposite sides   Heel squeezes 15 5 sec    Alt knee flexion * 10 ea 2 lb To tolerance left knee   Hip IR/ER * 10 2 lb With stable core/pelvis   Quadruped      Cat/camel stretch 10     Mid back stretch 3     Mid back rotation stretch 3 ea     Alt arm raises 10  added   Treadmill walking 6 min . 9 mph

## 2019-04-16 ENCOUNTER — APPOINTMENT (OUTPATIENT)
Dept: PHYSICAL THERAPY | Age: 54
End: 2019-04-16
Payer: COMMERCIAL

## 2019-04-18 ENCOUNTER — APPOINTMENT (OUTPATIENT)
Dept: PHYSICAL THERAPY | Age: 54
End: 2019-04-18
Payer: COMMERCIAL

## 2019-04-20 DIAGNOSIS — M51.26 LUMBAR HERNIATED DISC: ICD-10-CM

## 2019-04-23 RX ORDER — TIZANIDINE 4 MG/1
TABLET ORAL
Qty: 30 TABLET | Refills: 1 | Status: SHIPPED | OUTPATIENT
Start: 2019-04-23 | End: 2020-03-13

## 2019-04-26 ENCOUNTER — HOSPITAL ENCOUNTER (OUTPATIENT)
Dept: PHYSICAL THERAPY | Age: 54
Setting detail: THERAPIES SERIES
Discharge: HOME | End: 2019-04-26
Payer: COMMERCIAL

## 2019-04-26 PROCEDURE — 97110 THERAPEUTIC EXERCISES: CPT

## 2019-04-26 NOTE — FLOWSHEET NOTE
[x] [facility] [facility]  [address]  [phone]  F: [phone]        Physical Therapy Daily Treatment Note    Date:  2019  Patient Name:  Dacia Rizo    :  1965  MRN: [id number]  Physician: Dacia Rizo                      : 1965                        MRN: [id number]  Physician: Harman Montano MD               Insurance: [facility]  Medical Diagnosis: M54.16 (ICD-10-CM) - Lumbar radiculopathy                Rehab Codes: pain M54.5, M54.17, posture R29.3,   Onset Date: May 16, 2018                Next 's appt. :        Visit# / total visits:   Cancels/No Shows: 2/0     Subjective:    Pain:  [x] Yes  [] No Location: low back  Pain Rating: (0-10 scale) 5/10 (10/10 at worst)   Pain altered Tx:  [x] No  [] Yes  Action:  Comments:Patient not getting enough sleep, feeling numb on this date due to the weather and driving long distances. Pt can be woken by pain during the night. Objective:     Modalities: Hot pack to low back-not today  Precautions:  Exercises:  Exercise Reps/ Time Weight/ Level  Comments   SciFit 5 min Level 2 x    Seated:       Hamstring stretches 3 ea 15 sec x stool   Piriformis stretches 3 ea 15 sec x Figure 4 with forward lean   Supine:       Piriformis stretch  15 sec  Figure 4-knee to opposite shoulder   Isometric abdominals 10  5 sec     Alt bent leg raises 10 2 lb x    Alt arm raises 10 2 lb x    Alt arm & leg raises 10 2 lb/2 lb x Same side   Prone:     On 1 pillow   Prone lying ---      Prone prop on elbows 3 min  x    Prone press ups * 10  x    Gluteal sets * 15 5 sec x    Alt arm raises 15 2 lb x    Alt leg raises * 15 2 lb x    Alt arm and leg raises * 10 2 lb/2 lb x Opposite sides   Heel squeezes 15 5 sec x    Alt knee flexion * 10 ea 2 lb x To tolerance left knee   Hip IR/ER * 10 2 lb x With stable core/pelvis   Quadruped       Cat/camel stretch 10  -    Mid back stretch 3  -    Mid back rotation stretch

## 2019-05-02 ENCOUNTER — HOSPITAL ENCOUNTER (OUTPATIENT)
Dept: PHYSICAL THERAPY | Age: 54
Setting detail: THERAPIES SERIES
Discharge: HOME | End: 2019-05-02
Payer: COMMERCIAL

## 2019-05-02 PROCEDURE — 97110 THERAPEUTIC EXERCISES: CPT

## 2019-05-02 NOTE — FLOWSHEET NOTE
[x] [facility] [facility]  [address]  [phone]  F: [phone]        Physical Therapy Daily Treatment Note    Date:  2019  Patient Name:  Elias Dawson    :  1965  MRN: [id number]  Physician: Elias Dawson                      : 1965                        MRN: [id number]  Physician: Kathy Neil MD               Insurance: [facility]  Medical Diagnosis: M54.16 (ICD-10-CM) - Lumbar radiculopathy                Rehab Codes: pain M54.5, M54.17, posture R29.3,   Onset Date: May 16, 2018                Next 's appt. :        Visit# / total visits:   Cancels/No Shows: 2/0     Subjective:    Pain:  [x] Yes  [] No Location: low back  Pain Rating: (0-10 scale) 5/10 (10/10 at worst)   Pain altered Tx:  [x] No  [] Yes  Action:  Comments:Patient states he believes he's starting to come to  with the fact that his back pain will always be there. States he doesn't have pain in his legs anymore and is glad about this, however there always seems to be a pain in his low back whatever he does. Objective:     Modalities: Hot pack to low back-not today  Precautions:  Exercises:  Exercise Reps/ Time Weight/ Level  Comments   SciFit 5 min Level 2 x    Seated:       Hamstring stretches 3 ea 15 sec  stool   Piriformis stretches 3 ea 15 sec  Figure 4 with forward lean   Supine:       Piriformis stretch  15 sec  Figure 4-knee to opposite shoulder   Isometric abdominals 10  5 sec     Alt bent leg raises 10 2 lb x    Alt arm raises 16 2 lb x    Alt arm & leg raises 10 2 lb/2 lb x Same side   Prone:     On 1 pillow   Prone lying ---      Prone prop on elbows 3 min  x    Prone press ups * 10  x    Gluteal sets * 15 5 sec x    Alt arm raises 15x2 2 lb x    Alt leg raises * 15x2 2 lb x    Alt arm and leg raises * 10 2 lb/2 lb x Opposite sides   Heel squeezes 15 5 sec     Alt knee flexion * 10 ea 2 lb x To tolerance left knee   Hip IR/ER * 10 2 lb x With stable core/pelvis   Quadruped       Cat/camel stretch 10  -    Mid back stretch 3  -    Mid back rotation stretch 3 ea  -    Alt arm raises 10  - added   Treadmill walking 6 min . 9 mph - Retro for stabilization          Standing       Belt assisted repeated trunk extension 4x10  x Added 5/2; improves feelings of \"pinching\" and improves pain with all trunk AROM                        Other:      Specific Instructions for next treatment: may try heel lift in rt shoe, progress quadruped DLS      Treatment Charges: Mins Units   []  Modalities-hot pack     [x]  Ther Exercise 42 3   []  Manual Therapy     []  Ther Activities     []  Aquatics     []  Vasocompression     []  Other     Total Treatment time 42 3       Assessment: [x] Progressing toward goals- Pt had difficulty with all exercises involving trunk flexion this date, including supine abd bracing with alternating legs, hamstring/piriformis stretching, etc. Held these exercises and focused on extension based exercise with good result- initially stiff and \"pinching\" in low back, but improves with repetition. Ambulation and low back pain reportedly decreased after treatment - spent significant time educating pt on these improvements being directly related to that exercise, pathology, etc; pt verbalizes understanding but carryover is questioned - continues to discuss \"needing to get through this to get to my next steps\". Poor outlook on therapy is somewhat hindering progress. [] No change. .       [x] Other: Pt does seem to benefit from extension based exercise this date; all weighted exercise aggravated low back. Finished with extension stretch with belt to which pt reports improvement in symptoms. May want to reduce weight/reps at next visit for improved tolerance.     STG: (to be met in 10 treatments)  1. ? Pain: Keep pain at 4 or less most times, less episodes of leg tingling reported  2. ? ROM: full extension without pain  3. ? Strength:  4. ? Function:

## 2019-05-10 ENCOUNTER — HOSPITAL ENCOUNTER (OUTPATIENT)
Dept: PHYSICAL THERAPY | Age: 54
Setting detail: THERAPIES SERIES
Discharge: HOME | End: 2019-05-10
Payer: COMMERCIAL

## 2019-05-17 ENCOUNTER — HOSPITAL ENCOUNTER (OUTPATIENT)
Dept: PHYSICAL THERAPY | Age: 54
Setting detail: THERAPIES SERIES
End: 2019-05-17
Payer: COMMERCIAL

## 2019-05-20 ENCOUNTER — HOSPITAL ENCOUNTER (OUTPATIENT)
Dept: PHYSICAL THERAPY | Age: 54
Setting detail: THERAPIES SERIES
Discharge: HOME | End: 2019-05-20
Payer: COMMERCIAL

## 2019-05-20 NOTE — FLOWSHEET NOTE
[x] [facility]) [facility] &  Therapy  [address]    [phone]  F: [phone]   [] [address]  [facility] [address]   [address]  P: [phone]  F: [phone]  [] [address]  [address] [facility]  P: [phone]  F: [phone]   [] [address]  [facility] [address]   [place]: [phone]  F: [phone]  [] [place] [address] [facility] [address]  [place]: [phone]   F: [phone]     Physical Therapy Cancel/No Show note    Date: 2019  Patient: Andreia Gonzalez  : 1965  MRN: [id number]    Cancels/No Shows to date: 3/2    For today's appointment patient:    []  Cancelled    [] Rescheduled appointment    [x] No-show     Reason given by patient:    []  Patient ill    []  Conflicting appointment    [] No transportation      [] Conflict with work    [] No reason given    [] Weather related    [] Other:      Comments:  Pt is not scheduled for any future appointments.        [] Next appointment was confirmed    Electronically signed by: Malena Wheeler PT

## 2019-05-24 ENCOUNTER — HOSPITAL ENCOUNTER (OUTPATIENT)
Dept: PHYSICAL THERAPY | Age: 54
Setting detail: THERAPIES SERIES
Discharge: HOME | End: 2019-05-24
Payer: COMMERCIAL

## 2019-05-24 PROCEDURE — 97110 THERAPEUTIC EXERCISES: CPT
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
## 2019-05-31 ENCOUNTER — HOSPITAL ENCOUNTER (OUTPATIENT)
Dept: PHYSICAL THERAPY | Age: 54
Setting detail: THERAPIES SERIES
Discharge: HOME | End: 2019-05-31
Payer: COMMERCIAL

## 2019-05-31 NOTE — FLOWSHEET NOTE
[x] [address]  - [facility] &  Therapy  [address]    [phone]  F: [phone]   [] [address] [facility] [address]   [address]  P: [phone]  F: [phone]  [] [place]  [address] [facility]  P: [phone]  F: [phone]   [] [address]  [facility] [address]   P: [phone]  F: [phone]  [] [address]  [place]: [phone]   F: [phone]     Physical Therapy Cancel/No Show note    Date: 2019  Patient: Jaleel Reeding  : 1965  MRN: [id number]    Cancels/No Shows to date: 3/3    For today's appointment patient:    []  Cancelled    [] Rescheduled appointment    [x] No-show     Reason given by patient:    []  Patient ill    []  Conflicting appointment    [] No transportation      [] Conflict with work    [] No reason given    [] Weather related    [] Other:      Comments:         [] Next appointment was confirmed    Electronically signed by: Erika Ricardo PT

## 2019-06-21 ENCOUNTER — OFFICE VISIT (OUTPATIENT)
Dept: FAMILY MEDICINE CLINIC | Age: 54
End: 2019-06-21
Payer: COMMERCIAL

## 2019-06-21 VITALS
DIASTOLIC BLOOD PRESSURE: 66 MMHG | WEIGHT: 193 LBS | TEMPERATURE: 98 F | BODY MASS INDEX: 26.93 KG/M2 | SYSTOLIC BLOOD PRESSURE: 106 MMHG | HEART RATE: 85 BPM

## 2019-06-21 DIAGNOSIS — R73.03 PREDIABETES: ICD-10-CM

## 2019-06-21 DIAGNOSIS — M54.41 CHRONIC RIGHT-SIDED LOW BACK PAIN WITH RIGHT-SIDED SCIATICA: Primary | ICD-10-CM

## 2019-06-21 DIAGNOSIS — E78.5 DYSLIPIDEMIA: ICD-10-CM

## 2019-06-21 DIAGNOSIS — G89.29 CHRONIC RIGHT-SIDED LOW BACK PAIN WITH RIGHT-SIDED SCIATICA: Primary | ICD-10-CM

## 2019-06-21 PROCEDURE — 99213 OFFICE O/P EST LOW 20 MIN: CPT | Performed by: STUDENT IN AN ORGANIZED HEALTH CARE EDUCATION/TRAINING PROGRAM

## 2019-06-21 PROCEDURE — 1036F TOBACCO NON-USER: CPT | Performed by: STUDENT IN AN ORGANIZED HEALTH CARE EDUCATION/TRAINING PROGRAM

## 2019-06-21 PROCEDURE — 3017F COLORECTAL CA SCREEN DOC REV: CPT | Performed by: STUDENT IN AN ORGANIZED HEALTH CARE EDUCATION/TRAINING PROGRAM

## 2019-06-21 PROCEDURE — G8427 DOCREV CUR MEDS BY ELIG CLIN: HCPCS | Performed by: STUDENT IN AN ORGANIZED HEALTH CARE EDUCATION/TRAINING PROGRAM

## 2019-06-21 PROCEDURE — G8417 CALC BMI ABV UP PARAM F/U: HCPCS | Performed by: STUDENT IN AN ORGANIZED HEALTH CARE EDUCATION/TRAINING PROGRAM

## 2019-06-21 ASSESSMENT — ENCOUNTER SYMPTOMS: BACK PAIN: 1

## 2019-06-21 NOTE — PATIENT INSTRUCTIONS
Thank you for letting us take care of you today. We hope all your questions were addressed. If a question was overlooked or something else comes to mind after you return home, please contact a member of your Care Team listed below. Please make sure you have a routine office visit set up to follow-up on [address]. Your Care Team at [place] [address] is Team #1  Edelmira Krishna MD (Faculty)  Amaury Farooq MD (Faculty)  Sonido Vila MD (Resident)  Kala Cardenas MD (Resident)  Le Montanez MD (Resident)  Marino Jones MD (Resident)  Tate Nye., SPENSER Mack., SPENSER Ybarra Officer., [facility] office)  Junior Mendoza [facility] office)  Rk Sanon [facility] office)  LIZ Menchaca RMA ([address] [facility])  Eldridge Libman, Ph.D., (Behavioral Services)  [facility] ([facility])     Office phone number: [phone]    If you need to get in right away due to illness, please be advised we have \"Same Day\" appointments available Monday-Friday. Please call us at [phone] option #3 to schedule your \"Same Day\" appointment. Patient Education        Learning About How to Have a Healthy Back  What causes back pain? Back pain is often caused by overuse, strain, or injury. For example, people often hurt their backs playing sports or working in the yard, being jolted in a car accident, or lifting something too heavy. Aging plays a part too. Your bones and muscles tend to lose strength as you age, which makes injury more likely. The spongy discs between the bones of the spine (vertebrae) may suffer from wear and tear and no longer provide enough cushion between the bones. A disc that bulges or breaks open (herniated disc) can press on nerves, causing back pain. In some people, back pain is the result of arthritis, broken vertebrae caused by bone loss (osteoporosis), illness, or a spine problem.   Although most people have back pain at one time or another, there are steps you can take to make it less likely. How can you have a healthy back? Reduce stress on your back through good posture  Slumping or slouching alone may not cause low back pain. But after the back has been strained or injured, bad posture can make pain worse. · Sleep in a position that maintains your back's normal curves and on a mattress that feels comfortable. Sleep on your side with a pillow between your knees, or sleep on your back with a pillow under your knees. These positions can reduce strain on your back. · Stand and sit up straight. \"Good posture\" generally means your ears, shoulders, and hips are in a straight line. · If you must stand for a long time, put one foot on a stool, ledge, or box. Switch feet every now and then. · Sit in a chair that is low enough to let you place both feet flat on the floor with both knees nearly level with your hips. If your chair or desk is too high, use a footrest to raise your knees. Place a small pillow, a rolled-up towel, or a lumbar roll in the curve of your back if you need extra support. · Try a kneeling chair, which helps tilt your hips forward. This takes pressure off your lower back. · Try sitting on an exercise ball. It can rock from side to side, which helps keep your back loose. · When driving, keep your knees nearly level with your hips. Sit straight, and drive with both hands on the steering wheel. Your arms should be in a slightly bent position. Reduce stress on your back through careful lifting  · Squat down, bending at the hips and knees only. If you need to, put one knee to the floor and extend your other knee in front of you, bent at a right angle (half kneeling). · Press your chest straight forward. This helps keep your upper back straight while keeping a slight arch in your low back. · Hold the load as close to your body as possible, at the level of your belly button (navel).   · Use your feet to change direction, taking small steps. · Lead with your hips as you change direction. Keep your shoulders in line with your hips as you move. · Set down your load carefully, squatting with your knees and hips only. Exercise and stretch your back  · Do some exercise on most days of the week, if your doctor says it is okay. You can walk, run, swim, or cycle. · Stretch your back muscles. Here are a few exercises to try:  ? Lie on your back, and gently pull one bent knee to your chest. Put that foot back on the floor, and then pull the other knee to your chest.  ? Do pelvic tilts. Lie on your back with your knees bent. Tighten your stomach muscles. Pull your belly button (navel) in and up toward your ribs. You should feel like your back is pressing to the floor and your hips and pelvis are slightly lifting off the floor. Hold for 6 seconds while breathing smoothly. ? Sit with your back flat against a wall. · Keep your core muscles strong. The muscles of your back, belly (abdomen), and buttocks support your spine. ? Pull in your belly and imagine pulling your navel toward your spine. Hold this for 6 seconds, then relax. Remember to keep breathing normally as you tense your muscles. ? Do curl-ups. Always do them with your knees bent. Keep your low back on the floor, and curl your shoulders toward your knees using a smooth, slow motion. Keep your arms folded across your chest. If this bothers your neck, try putting your hands behind your neck (not your head), with your elbows spread apart. ? Lie on your back with your knees bent and your feet flat on the floor. Tighten your belly muscles, and then push with your feet and raise your buttocks up a few inches. Hold this position 6 seconds as you continue to breathe normally, then lower yourself slowly to the floor. Repeat 8 to 12 times. ? If you like group exercise, try Pilates or yoga. These classes have poses that strengthen the core muscles.   Lead a healthy lifestyle  · Stay at a healthy weight to avoid strain on your back. · Do not smoke. Smoking increases the risk of osteoporosis, which weakens the spine. If you need help quitting, talk to your doctor about stop-smoking programs and medicines. These can increase your chances of quitting for good. Where can you learn more? Go to https://chpepiceweb.VAWT Manufacturing. org and sign in to your StrikeIron account. Enter L315 in the Fortnox box to learn more about \"Learning About How to Have a Healthy Back. \"     If you do not have an account, please click on the \"Sign Up Now\" link. Current as of: September 20, 2018  Content Version: 12.0  © 4872-3671 [email or web address]. Care instructions adapted under license by [facility] ([facility]). If you have questions about a medical condition or this instruction, always ask your healthcare professional. [place] [address] any warranty or liability for your use of this information. Patient Education        Back Pain: Care Instructions  Your Care Instructions    Back pain has many possible causes. It is often related to problems with muscles and ligaments of the back. It may also be related to problems with the nerves, discs, or bones of the back. Moving, lifting, standing, sitting, or sleeping in an awkward way can strain the back. Sometimes you don't notice the injury until later. Arthritis is another common cause of back pain. Although it may hurt a lot, back pain usually improves on its own within several weeks. Most people recover in 12 weeks or less. Using good home treatment and being careful not to stress your back can help you feel better sooner. Follow-up care is a key part of your treatment and safety. Be sure to make and go to all appointments, and call your doctor if you are having problems. It's also a good idea to know your test results and keep a list of the medicines you take. How can you care for yourself at home?   · Sit or lie in positions that are could make it hard to stand up.)     · You lose control of your bladder or bowels.    Watch closely for changes in your health, and be sure to contact your doctor if:    · You have a fever, lose weight, or don't feel well.     · You do not get better as expected. Where can you learn more? Go to https://chpepicewfigueroa.VYRE Limited. org and sign in to your CoinSeed account. Enter K841 in the Aviacode box to learn more about \"Back Pain: Care Instructions. \"     If you do not have an account, please click on the \"Sign Up Now\" link. Current as of: September 20, 2018  Content Version: 12.0  © 6184-2524 Crispy Gamer. Care instructions adapted under license by [employer] [facility] ([facility]). If you have questions about a medical condition or this instruction, always ask your healthcare professional. [place] [address] any warranty or liability for your use of this information. Patient Education        Learning About Relief for Back Pain  What is back tension and strain? Back strain happens when you overstretch, or pull, a muscle in your back. You may hurt your back in an accident or when you exercise or lift something. Most back pain will get better with rest and time. You can take care of yourself at home to help your back heal.  What can you do first to relieve back pain? When you first feel back pain, try these steps:  · Walk. Take a short walk (10 to 20 minutes) on a level surface (no slopes, hills, or stairs) every 2 to 3 hours. Walk only distances you can manage without pain, especially leg pain. · Relax. Find a comfortable position for rest. Some people are comfortable on the floor or a medium-firm bed with a small pillow under their head and another under their knees. Some people prefer to lie on their side with a pillow between their knees. Don't stay in one position for too long. · Try heat or ice.  Try using a heating pad on a low or medium setting, or take a warm shower, for 15 to 20 minutes every 2 to 3 hours. Or you can buy single-use heat wraps that last up to 8 hours. You can also try an ice pack for 10 to 15 minutes every 2 to 3 hours. You can use an ice pack or a bag of frozen vegetables wrapped in a thin towel. There is not strong evidence that either heat or ice will help, but you can try them to see if they help. You may also want to try switching between heat and cold. · Take pain medicine exactly as directed. ? If the doctor gave you a prescription medicine for pain, take it as prescribed. ? If you are not taking a prescription pain medicine, ask your doctor if you can take an over-the-counter medicine. What else can you do? · Stretch and exercise. Exercises that increase flexibility may relieve your pain and make it easier for your muscles to keep your spine in a good, neutral position. And don't forget to keep walking. · Do self-massage. You can use self-massage to unwind after work or school or to energize yourself in the morning. You can easily massage your feet, hands, or neck. Self-massage works best if you are in comfortable clothes and are sitting or lying in a comfortable position. Use oil or lotion to massage bare skin. · Reduce stress. Back pain can lead to a vicious [place]: Distress about the pain tenses the muscles in your back, which in turn causes more pain. Learn how to relax your mind and your muscles to lower your stress. Where can you learn more? Go to https://VOLITIONRXalfredo.Bottlenose. org and sign in to your [employer] account. Enter Z551 in the [facility] box to learn more about \"Learning About Relief for Back Pain. \"     If you do not have an account, please click on the \"Sign Up Now\" link. Current as of: September 20, 2018  Content Version: 12.0  © 6838-3188 Healthwise, Incorporated. Care instructions adapted under license by [facility] ([facility]).  If you have questions about a medical condition or this instruction, always ask your healthcare professional. [place] [address] any warranty or liability for your use of this information. Patient Education        Back Pain and Sex: Care Instructions  Your Care Instructions    It's common to be afraid to have sex when you have back pain. But it doesn't have to stop you from having a satisfying sex life. Talk to your partner about which movements are comfortable for you and which aren't. And if the thought of having pain during sex terrifies you, talk about that too. Follow-up care is a key part of your treatment and safety. Be sure to make and go to all appointments, and call your doctor if you are having problems. It's also a good idea to know your test results and keep a list of the medicines you take. How can you care for yourself at home? · Learn which sexual positions may be good or bad for your back. For example, some back problems cause pain when you bend forward. Others cause problems when you arch your back. · Try positions you've never considered before. You may need to use a firmer surface than your mattress, such as a soft rug on the floor or even a sturdy chair. Oral sex might be easier than intercourse. · Go slow. Sex is like exercise--warming up and stretching first are important. Many people use yoga to gently stretch their muscles. When you're ready to have sex, keep your movements slow and gentle. · Take a hot shower to help relax your muscles. Or have your partner give you a massage. · Increase the time you and your partner spend touching and caressing each other before sex (foreplay). · If it hurts, stop. That may seem obvious, but when things get passionate, it can be hard to stay in control. Try to keep it slow so that you can stop right away if your back starts to hurt. When should you call for help? Watch closely for changes in your health, and be sure to contact your doctor if you have any problems. Where can you learn more?   Go to https://chpepiceweb.healthFilement. org and sign in to your Smarp Oy account. Enter R333 in the [place] box to learn more about \"Back Pain and Sex: Care Instructions. \"     If you do not have an account, please click on the \"Sign Up Now\" link. Current as of: September 20, 2018  Content Version: 12.0  © 3197-0959 Amerityre. Care instructions adapted under license by [facility] ([facility]). If you have questions about a medical condition or this instruction, always ask your healthcare professional. [place] [address] any warranty or liability for your use of this information. Patient Education        Back Care and Preventing Injuries: Care Instructions  Your Care Instructions    You can hurt your back doing many everyday activities: lifting a heavy box, bending down to garden, exercising at the gym, and even getting out of bed. But you can keep your back strong and healthy by doing some exercises. You also can follow a few tips for sitting, sleeping, and lifting to avoid hurting your back again. Talk to your doctor before you start an exercise program. Ask for help if you want to learn more about keeping your back healthy. Follow-up care is a key part of your treatment and safety. Be sure to make and go to all appointments, and call your doctor if you are having problems. It's also a good idea to know your test results and keep a list of the medicines you take. How can you care for yourself at home? · Stay at a healthy weight to avoid strain on your lower back. · Do not smoke. Smoking increases the risk of osteoporosis, which weakens the spine. If you need help quitting, talk to your doctor about stop-smoking programs and medicines. These can increase your chances of quitting for good. · Make sure you sleep in a position that maintains your back's normal curves and on a mattress that feels comfortable.  Sleep on your side with a pillow between your knees, or sleep on your back with a pillow under your knees. These positions can reduce strain on your back. · When you get out of bed, lie on your side and bend both knees. Drop your feet over the edge of the bed as you push up with both arms. Scoot to the edge of the bed. Make sure your feet are in line with your rear end (buttocks), and then stand up. · If you must stand for a long time, put one foot on a stool, ledge, or box. Exercise to strengthen your back and other muscles  · Get at least 30 minutes of exercise on most days of the week. Walking is a good choice. You also may want to do other activities, such as running, swimming, cycling, or playing tennis or team sports. · Stretch your back muscles. Here are few exercises to try:  ? Lie on your back with your knees bent and your feet flat on the floor. Gently pull one bent knee to your chest. Put that foot back on the floor, and then pull the other knee to your chest. Hold for 15 to 30 seconds. Repeat 2 to 4 times. ? Do pelvic tilts. Lie on your back with your knees bent. Tighten your stomach muscles. Pull your belly button (navel) in and up toward your ribs. You should feel like your back is pressing to the floor and your hips and pelvis are slightly lifting off the floor. Hold for 6 seconds while breathing smoothly. · Keep your core muscles strong. The muscles of your back, belly (abdomen), and buttocks support your spine. ? Pull in your belly, and imagine pulling your navel toward your spine. Hold this for 6 seconds, then relax. Remember to keep breathing normally as you tense your muscles. ? Do curl-ups. Always do them with your knees bent. Keep your low back on the floor, and curl your shoulders toward your knees using a smooth, slow motion. Keep your arms folded across your chest. If this bothers your neck, try putting your hands behind your neck (not your head), with your elbows spread apart.   ? Lie on your back with your knees bent and your feet flat on the floor. Tighten your belly muscles, and then push with your feet and raise your buttocks up a few inches. Hold this position 6 seconds as you continue to breathe normally, then lower yourself slowly to the floor. Repeat 8 to 12 times. ? If you like group exercise, try Pilates or yoga. These classes have poses that strengthen the core muscles. Protect your back when you sit  · Place a small pillow, a rolled-up towel, or a lumbar roll in the curve of your back if you need extra support. · Sit in a chair that is low enough to let you place both feet flat on the floor with both knees nearly level with your hips. If your chair or desk is too high, use a foot rest to raise your knees. · When driving, keep your knees nearly level with your hips. Sit straight, and drive with both hands on the steering wheel. Your arms should be in a slightly bent position. · Try a kneeling chair, which helps tilt your hips forward. This takes pressure off your lower back. · Try sitting on an exercise ball. It can rock from side to side, which helps keep your back loose. Lift properly  · Squat down, bending at the hips and knees only. If you need to, put one knee to the floor and extend your other knee in front of you, bent at a right angle (half kneeling). · Press your chest straight forward. This helps keep your upper back straight while keeping a slight arch in your low back. · Hold the load as close to your body as possible, at the level of your navel. · Use your feet to change direction, taking small steps. · Lead with your hips as you change direction. Keep your shoulders in line with your hips as you move. Do not twist your body. · Set down your load carefully, squatting with your knees and hips only. When should you call for help? Watch closely for changes in your health, and be sure to contact your doctor if you have any problems. Where can you learn more? Go to https://felipe.health-EverySignal. org and sign in to to know your test results and keep a list of the medicines you take. How can you care for yourself at home? · Pace yourself. Break up large jobs into smaller tasks. Save harder tasks for days when you have less pain, or go back and forth between hard tasks and easier ones. Take rest breaks. · Relax, and reduce stress. Relaxation techniques such as deep breathing or meditation can help. · Keep moving. Gentle, daily exercise can help reduce pain over the long run. Try low- or no-impact exercises such as walking, swimming, and stationary biking. Do stretches to stay flexible. · Try heat, cold packs, and massage. · Get enough sleep. Chronic pain can make you tired and drain your energy. Talk with your doctor if you have trouble sleeping because of pain. · Think positive. Your thoughts can affect your pain level. Do things that you enjoy to distract yourself when you have pain instead of focusing on the pain. See a movie, read a book, listen to music, or spend time with a friend. · If you think you are depressed, talk to your doctor about treatment. · Keep a daily pain diary. Record how your moods, thoughts, sleep patterns, activities, and medicine affect your pain. You may find that your pain is worse during or after certain activities or when you are feeling a certain emotion. Having a record of your pain can help you and your doctor find the best ways to treat your pain. · Take pain medicines exactly as directed. ? If the doctor gave you a prescription medicine for pain, take it as prescribed. ? If you are not taking a prescription pain medicine, ask your doctor if you can take an over-the-counter medicine. Reducing constipation caused by pain medicine  · Include fruits, vegetables, beans, and whole grains in your diet each day. These foods are high in fiber. · Drink plenty of fluids, enough so that your urine is light yellow or clear like water.  If you have kidney, heart, or liver disease and have to limit fluids, talk with your doctor before you increase the amount of fluids you drink. · If your doctor recommends it, get more exercise. Walking is a good choice. Bit by bit, increase the amount you walk every day. Try for at least 30 minutes on most days of the week. · Schedule time each day for a bowel movement. A daily routine may help. Take your time and do not strain when having a bowel movement. When should you call for help? Call your doctor now or seek immediate medical care if:    · Your pain gets worse or is out of control.     · You feel down or blue, or you do not enjoy things like you once did. You may be depressed, which is common in people with chronic pain. Depression can be treated.     · You have vomiting or cramps for more than 2 hours.    Watch closely for changes in your health, and be sure to contact your doctor if:    · You cannot sleep because of pain.     · You are very worried or anxious about your pain.     · You have trouble taking your pain medicine.     · You have any concerns about your pain medicine.     · You have trouble with bowel movements, such as:  ? No bowel movement in 3 days. ? Blood in the anal area, in your stool, or on the toilet paper. ? Diarrhea for more than 24 hours. Where can you learn more? Go to https://Jobbr.AugmentWare. org and sign in to your MinusNine Technologies account. Enter N004 in the Mochila box to learn more about \"Chronic Pain: Care Instructions. \"     If you do not have an account, please click on the \"Sign Up Now\" link. Current as of: Taylor 3, 2018  Content Version: 12.0  © 2074-2051 Healthwise, Incorporated. Care instructions adapted under license by [facility] ([facility]). If you have questions about a medical condition or this instruction, always ask your healthcare professional. [place] [address] any warranty or liability for your use of this information.          Patient Education        Prediabetes: Care Instructions  Your Care Instructions    Prediabetes is a warning sign that you are at risk for getting type 2 diabetes. It means that your blood sugar is higher than it should be. The food you eat turns into sugar, which your body uses for energy. Normally, an organ called the pancreas makes insulin, which allows the sugar in your blood to get into your body's cells. But when your body can't use insulin the right way, the sugar doesn't move into cells. It stays in your blood instead. This is called insulin resistance. The buildup of sugar in the blood causes prediabetes. The good news is that lifestyle changes may help you get your blood sugar back to normal and help you avoid or delay diabetes. Follow-up care is a key part of your treatment and safety. Be sure to make and go to all appointments, and call your doctor if you are having problems. It's also a good idea to know your test results and keep a list of the medicines you take. How can you care for yourself at home? · Watch your weight. A healthy weight helps your body use insulin properly. · Limit the amount of calories, sweets, and unhealthy fat you eat. Ask your doctor if you should see a dietitian. A registered dietitian can help you create meal plans that fit your lifestyle. · Get at least 30 minutes of exercise on most days of the week. Exercise helps control your blood sugar. It also helps you maintain a healthy weight. Walking is a good choice. You also may want to do other activities, such as running, swimming, cycling, or playing tennis or team sports. · Do not smoke. Smoking can make prediabetes worse. If you need help quitting, talk to your doctor about stop-smoking programs and medicines. These can increase your chances of quitting for good. · If your doctor prescribed medicines, take them exactly as prescribed. Call your doctor if you think you are having a problem with your medicine.  You will get more details on the specific medicines your doctor prescribes. When should you call for help? Watch closely for changes in your health, and be sure to contact your doctor if:    · You have any symptoms of diabetes. These may include:  ? Being thirsty more often. ? Urinating more. ? Being hungrier. ? Losing weight. ? Being very tired. ? Having blurry vision.     · You have a wound that will not heal.     · You have an infection that will not go away.     · You have problems with your blood pressure.     · You want more information about diabetes and how you can keep from getting it. Where can you learn more? Go to https://chpepiceweb.lensgen. org and sign in to your Jiongji App account. Enter I222 in the StudyMax box to learn more about \"Prediabetes: Care Instructions. \"     If you do not have an account, please click on the \"Sign Up Now\" link. Current as of: July 25, 2018  Content Version: 12.0  © 4695-4575 [employer]. Care instructions adapted under license by [facility] ([facility]). If you have questions about a medical condition or this instruction, always ask your healthcare professional. [place] [address] any warranty or liability for your use of this information. Patient Education        Learning About Meal Planning for Diabetes  Why plan your meals? Meal planning can be a key part of managing diabetes. Planning meals and snacks with the right balance of carbohydrate, protein, and fat can help you keep your blood sugar at the target level you set with your doctor. You don't have to eat special foods. You can eat what your family eats, including sweets once in a while. But you do have to pay attention to how often you eat and how much you eat of certain foods. You may want to work with a dietitian or a certified diabetes educator. He or she can give you tips and meal ideas and can answer your questions about meal planning.  This health professional can also help you reach a healthy levels. A registered dietitian or diabetes educator can help you plan how much carbohydrate to include in each meal and snack. A guideline for your daily amount of carbohydrate is:  · 45 to 60 grams at each meal. That's about the same as 3 to 4 carbohydrate servings. · 15 to 20 grams at each snack. That's about the same as 1 carbohydrate serving. The Nutrition Facts label on packaged foods tells you how much carbohydrate is in a serving of the food. First, look at the serving size on the food label. Is that the amount you eat in a serving? All of the nutrition information on a food label is based on that serving size. So if you eat more or less than that, you'll need to adjust the other numbers. Total carbohydrate is the next thing you need to look for on the label. If you count carbohydrate servings, one serving of carbohydrate is 15 grams. For foods that don't come with labels, such as fresh fruits and vegetables, you'll need a guide that lists carbohydrate in these foods. Ask your doctor, dietitian, or diabetes educator about books or other nutrition guides you can use. If you take insulin, you need to know how many grams of carbohydrate are in a meal. This lets you know how much rapid-acting insulin to take before you eat. If you use an insulin pump, you get a constant rate of insulin during the day. So the pump must be programmed at meals to give you extra insulin to cover the rise in blood sugar after meals. When you know how much carbohydrate you will eat, you can take the right amount of insulin. Or, if you always use the same amount of insulin, you need to make sure that you eat the same amount of carbohydrate at meals. If you need more help to understand carbohydrate counting and food labels, ask your doctor, dietitian, or diabetes educator. How do you get started with meal planning? Here are some tips to get started:  · Plan your meals a week at a time. Don't forget to include snacks too.   · Use cookbooks or online recipes to plan several main meals. Plan some quick meals for busy nights. You also can double some recipes that freeze well. Then you can save half for other busy nights when you don't have time to cook. · Make sure you have the ingredients you need for your recipes. If you're running low on basic items, put these items on your shopping list too. · List foods that you use to make breakfasts, lunches, and snacks. List plenty of fruits and vegetables. · Post this list on the refrigerator. Add to it as you think of more things you need. · Take the list to the store to do your weekly shopping. Follow-up care is a key part of your treatment and safety. Be sure to make and go to all appointments, and call your doctor if you are having problems. It's also a good idea to know your test results and keep a list of the medicines you take. Where can you learn more? Go to https://[email or web address].Alter-[email or web address] org and sign in to your Winmedical account. Enter F548 in the Mimvi box to learn more about \"Learning About Meal Planning for Diabetes. \"     If you do not have an account, please click on the \"Sign Up Now\" link. Current as of: July 25, 2018  Content Version: 12.0  © 1959-9292 [employer]. Care instructions adapted under license by [facility] ([facility]). If you have questions about a medical condition or this instruction, always ask your healthcare professional. [place] [address] any warranty or liability for your use of this information. Patient Education        Learning About Diabetes Food Guidelines  Your Care Instructions    Meal planning is important to manage diabetes. It helps keep your blood sugar at a target level (which you set with your doctor). You don't have to eat special foods. You can eat what your family eats, including sweets once in a while.  But you do have to pay attention to how often you eat and how much you eat of certain foods.  You may want to work with a dietitian or a certified diabetes educator (CDE) to help you plan meals and snacks. A dietitian or CDE can also help you lose weight if that is one of your goals. What should you know about eating carbs? Managing the amount of carbohydrate (carbs) you eat is an important part of healthy meals when you have diabetes. Carbohydrate is found in many foods. · Learn which foods have carbs. And learn the amounts of carbs in different foods. ? Bread, cereal, pasta, and rice have about 15 grams of carbs in a serving. A serving is 1 slice of bread (1 ounce), ½ cup of cooked cereal, or 1/3 cup of cooked pasta or rice. ? Fruits have 15 grams of carbs in a serving. A serving is 1 small fresh fruit, such as an apple or orange; ½ of a banana; ½ cup of cooked or canned fruit; ½ cup of fruit juice; 1 cup of melon or raspberries; or 2 tablespoons of dried fruit. ? Milk and no-sugar-added yogurt have 15 grams of carbs in a serving. A serving is 1 cup of milk or 2/3 cup of no-sugar-added yogurt. ? Starchy vegetables have 15 grams of carbs in a serving. A serving is ½ cup of mashed potatoes or sweet potato; 1 cup winter squash; ½ of a small baked potato; ½ cup of cooked beans; or ½ cup cooked corn or green peas. · Learn how much carbs to eat each day and at each meal. A dietitian or CDE can teach you how to keep track of the amount of carbs you eat. This is called carbohydrate counting. · If you are not sure how to count carbohydrate grams, use the Plate Method to plan meals. It is a good, quick way to make sure that you have a balanced meal. It also helps you spread carbs throughout the day. ? Divide your plate by types of foods. Put non-starchy vegetables on half the plate, meat or other protein food on one-quarter of the plate, and a grain or starchy vegetable in the final quarter of the plate.  To this you can add a small piece of fruit and 1 cup of milk or yogurt, depending on how many carbs you are supposed to eat at a meal.  · Try to eat about the same amount of carbs at each meal. Do not \"save up\" your daily allowance of carbs to eat at one meal.  · Proteins have very little or no carbs per serving. Examples of proteins are beef, chicken, turkey, fish, eggs, tofu, cheese, cottage cheese, and peanut butter. A serving size of meat is 3 ounces, which is about the size of a deck of cards. Examples of meat substitute serving sizes (equal to 1 ounce of meat) are 1/4 cup of cottage cheese, 1 egg, 1 tablespoon of peanut butter, and ½ cup of tofu. How can you eat out and still eat healthy? · Learn to estimate the serving sizes of foods that have carbohydrate. If you measure food at home, it will be easier to estimate the amount in a serving of restaurant food. · If the meal you order has too much carbohydrate (such as potatoes, corn, or baked beans), ask to have a low-carbohydrate food instead. Ask for a salad or green vegetables. · If you use insulin, check your blood sugar before and after eating out to help you plan how much to eat in the future. · If you eat more carbohydrate at a meal than you had planned, take a walk or do other exercise. This will help lower your blood sugar. What else should you know? · Limit saturated fat, such as the fat from meat and dairy products. This is a healthy choice because people who have diabetes are at higher risk of heart disease. So choose lean cuts of meat and nonfat or low-fat dairy products. Use olive or canola oil instead of butter or shortening when cooking. · Don't skip meals. Your blood sugar may drop too low if you skip meals and take insulin or certain medicines for diabetes. · Check with your doctor before you drink alcohol. Alcohol can cause your blood sugar to drop too low. Alcohol can also cause a bad reaction if you take certain diabetes medicines. Follow-up care is a key part of your treatment and safety.  Be sure to make and go to all appointments, and call your doctor if you are having problems. It's also a good idea to know your test results and keep a list of the medicines you take. Where can you learn more? Go to https://[email or web address].Gatfol [email or web address] org and sign in to your Novatel Wireless account. Enter E497 in the [facility] box to learn more about \"Learning About Diabetes Food Guidelines. \"     If you do not have an account, please click on the \"Sign Up Now\" link. Current as of: July 25, 2018  Content Version: 12.0  © 4002-6948 [employer]. Care instructions adapted under license by [address] If you have questions about a medical condition or this instruction, always ask your healthcare professional. [address] any warranty or liability for your use of this information. Patient Education        Diabetes Blood Sugar Emergencies: Your Action Plan  How can you prevent a blood sugar emergency? An important part of living with diabetes is keeping your blood sugar in your target range. You'll need to know what to do if it's too high or too low. Managing your blood sugar levels helps you avoid emergencies. This care sheet will teach you about the signs of high and low blood sugar. It will help you make an action plan with your doctor for when these signs occur. Low blood sugar is more likely to happen if you take certain medicines for diabetes. It can also happen if you skip a meal, drink alcohol, or exercise more than usual.  You may get high blood sugar if you eat differently than you normally do. One example is eating more carbohydrate than usual. Having a cold, the flu, or other sudden illness can also cause high blood sugar levels. Levels can also rise if you miss a dose of medicine. Any change in how you take your medicine may affect your blood sugar level. So it's important to work with your doctor before you make any changes.   Check your blood sugar  Work with your doctor to fill in the blank spaces below that apply to you. Track your levels, know your target range, and write down ways you can get your blood sugar back in your target range. A log book can help you track your levels. Take the book to all of your medical appointments. · Check your blood sugar _____ times a day, at these times:________________________________________________. (For example: Before meals, at bedtime, before exercise, during exercise, other.)  · Your blood sugar target range before a meal is ___________________. Your blood sugar target range after a meal is _______________________. · Do this--___________________________________________________--to get your blood sugar back within your safe range if your blood sugar results are _________________________________________. (For example: Less than 70 or above 250 mg/dL.)  Call your doctor when your blood sugar results are ___________________________________. (For example: Less than 70 or above 250 mg/dL.)  What are the symptoms of low and high blood sugar? Common symptoms of low blood sugar are sweating and feeling shaky, weak, hungry, or confused. Symptoms can start quickly. Common symptoms of high blood sugar are feeling very thirsty or very hungry. You may also pass urine more often than usual. You may have blurry vision and may lose weight without trying. But some people may have high or low blood sugar without having any symptoms. That's a good reason to check your blood sugar on a regular schedule. What should you do if you have symptoms? Work with your doctor to fill in the blank spaces below that apply to you. Low blood sugar  If you have symptoms of low blood sugar, check your blood sugar. If it's below _____ ( for example, below 70), eat or drink a quick-sugar food that has about 15 grams of carbohydrate. Your goal is to get your level back to your safe range. Check your blood sugar again 15 minutes later.  If it's still not in your target range, take another 15 grams of carbohydrate and check your blood sugar again in 15 minutes. Repeat this until you reach your target. Then go back to your regular testing schedule. When you have low blood sugar, it's best to stop or reduce any physical activity until your blood sugar is back in your target range and is stable. If you must stay active, eat or drink 30 grams of carbohydrate. Then check your blood sugar again in 15 minutes. If it's not in your target range, take another 30 grams of carbohydrates. Check your blood sugar again in 15 minutes. Keep doing this until you reach your target. You can then go back to your regular testing schedule. If your symptoms or blood sugar levels are getting worse or have not improved after 15 minutes, seek medical care right away. Here are some examples of quick-sugar foods with 15 grams of carbohydrate:  · 3 or 4 glucose tablets  · 1 tube of glucose gel  · Hard candy (such as 3 Jolly Ranchers or 5 to 7 Life Savers)  · ½ cup to ¾ cup (4 to 6 ounces) of fruit juice or regular (not diet) soda  High blood sugar  If you have symptoms of high blood sugar, check your blood sugar. Your goal is to get your level back to your target range. If it's above ______ ( for example, above 250), follow these steps:  · If you missed a dose of your diabetes medicine, take it now. Take only the amount of medicine that you have been prescribed. Do not take more or less medicine. · Give yourself insulin if your doctor has prescribed it for high blood sugar. · Test for ketones, if the doctor told you to do so. If the results of the ketone test show a moderate-to-large amount of ketones, call the doctor for advice. · Wait 30 minutes after you take the extra insulin or the missed medicine. Check your blood sugar again. If your symptoms or blood sugar levels are getting worse or have not improved after taking these steps, seek medical care right away.   Follow-up care is a key part of your treatment

## 2019-06-21 NOTE — PROGRESS NOTES
Subjective:     Elias Dawson is a 48 y.o. male with  has a past medical history of Bipolar 1 disorder ([address] COPD (chronic obstructive pulmonary disease) ([address] Depression, Disc herniation, Duodenal ulcer, and Tobacco abuse. Family History   Problem Relation Age of Onset   [facility] Breast Cancer Mother     Other Father         GSW       Presented to the office today for:  Chief Complaint   Patient presents with    Back Pain     pt states its his lower back times 4 months ago       HPI  Follow up of low back with radiation of symptoms to right leg and rt leg weakness that is long standing for the past 1.5 year  Long standing since 1.5 year , states had mri done and was told to have bulging discs  Has been doing physical therapy  Not taking any of his medications including cholesterol pills  Denies red flag symptoms  Denies worsening numbness weakness, urinary or stool incontinence, fever    Last a1c was 5.9  Has not been compliant with diet    Dyslipidemia  Has been off zocor for a month      Review of Systems   Constitutional: Negative for fatigue and fever. Musculoskeletal: Positive for back pain. Neurological: Positive for numbness. Negative for tremors, seizures, syncope, speech difficulty and weakness. Objective:    /66 (Site: Left Upper Arm, Position: Sitting, Cuff Size: Medium Adult)   Pulse 85   Temp 98 °F (36.7 °C) (Oral)   Wt 193 lb (87.5 kg)   BMI 26.93 kg/m²    BP Readings from Last 3 Encounters:   06/21/19 106/66   03/18/19 128/85   03/11/19 (!) 141/94     Physical Exam   Constitutional: He appears well-developed and well-nourished. No distress. Cardiovascular: Normal rate, regular rhythm, normal heart sounds and intact distal pulses. Exam reveals no gallop and no friction rub. No murmur heard. Pulmonary/Chest: Effort normal and breath sounds normal. No stridor. No respiratory distress. He has no wheezes. He has no rales. He exhibits no tenderness.    Abdominal: He exhibits no distension. Musculoskeletal:        Lumbar back: He exhibits tenderness (rt l4l5 paraspinal area tenderness). He exhibits normal range of motion, no bony tenderness, no swelling, no edema, no deformity, no laceration, no pain, no spasm and normal pulse. B/ L LL  Power tone reflexes sensations wnl  Pulses wnl    B/l LL  slr test negative b/l  patricks test positive right side with pain relicited on rt lower back   patricks test negative left side   Skin: He is not diaphoretic. Lab Results   Component Value Date    WBC 7.0 08/01/2017    HGB 16.7 08/01/2017    HCT 50.4 08/01/2017     08/01/2017    CHOL 248 (H) 02/08/2019    TRIG 211 (H) 02/08/2019    HDL 44 02/08/2019    ALT 25 08/23/2016    AST 21 08/23/2016     08/01/2017    K 4.3 08/01/2017     08/01/2017    CREATININE 1.39 (H) 08/01/2017    BUN 14 08/01/2017    CO2 23 08/01/2017    TSH 1.67 08/23/2016    PSA 1.87 04/15/2016    INR 1.0 08/01/2017    LABA1C 5.9 02/06/2019     Lab Results   Component Value Date    CALCIUM 8.3 (L) 04/16/2017     Lab Results   Component Value Date    LDLCHOLESTEROL 162 (H) 02/08/2019       Assessment and Plan:    1. Chronic right-sided low back pain with right-sided sciatica    - [facility]  - educated about red flag symptoms    2. Dyslipidemia    - Lipid Panel; Future    3.  Prediabetes    - Hemoglobin A1C; Future          Requested Prescriptions      No prescriptions requested or ordered in this encounter       Medications Discontinued During This Encounter   Medication Reason    acetaminophen (TYLENOL) 325 MG tablet Therapy completed    nicotine (NICODERM CQ) 21 MG/24HR Therapy completed    HYDROcodone-acetaminophen (NORCO) 5-325 MG per tablet Therapy completed       Jamil received counseling on the following healthy behaviors:nutrition, exercise and medication adherence    Patient given educational materials : see patient instruction     Discussed use, benefit, and side effects of prescribed medications. Barriers to medication compliance addressed. All patient questionsanswered. Pt voiced understanding. Return in about 1 month (around 7/21/2019) for follow up of lab results low back pain .

## 2019-06-21 NOTE — PROGRESS NOTES
Attending Physician Statement    Wt Readings from Last 3 Encounters:   06/21/19 193 lb (87.5 kg)   03/18/19 223 lb 12.8 oz (101.5 kg)   03/11/19 230 lb (104.3 kg)     Temp Readings from Last 3 Encounters:   06/21/19 98 °F (36.7 °C) (Oral)   03/18/19 98.3 °F (36.8 °C) (Oral)   03/11/19 98.9 °F (37.2 °C) (Oral)     BP Readings from Last 3 Encounters:   06/21/19 106/66   03/18/19 128/85   03/11/19 (!) 141/94     Pulse Readings from Last 3 Encounters:   06/21/19 85   03/18/19 101   03/11/19 97         I have discussed the care of Jaleel Drilling, including pertinent history and exam findings with the resident. I have reviewed the key elements of all parts of the encounter with the resident. I agree with the assessment, plan and orders as documented by the resident.   (GE Modifier)

## 2019-07-30 ENCOUNTER — INITIAL CONSULT (OUTPATIENT)
Dept: PAIN MANAGEMENT | Age: 54
End: 2019-07-30
Payer: COMMERCIAL

## 2019-07-30 VITALS
BODY MASS INDEX: 27.04 KG/M2 | SYSTOLIC BLOOD PRESSURE: 113 MMHG | WEIGHT: 199.6 LBS | OXYGEN SATURATION: 97 % | HEART RATE: 81 BPM | DIASTOLIC BLOOD PRESSURE: 77 MMHG | HEIGHT: 72 IN

## 2019-07-30 DIAGNOSIS — F19.10 POLYSUBSTANCE ABUSE (HCC): ICD-10-CM

## 2019-07-30 DIAGNOSIS — M51.26 LUMBAR DISC HERNIATION: ICD-10-CM

## 2019-07-30 DIAGNOSIS — F25.0 SCHIZOAFFECTIVE DISORDER, BIPOLAR TYPE (HCC): ICD-10-CM

## 2019-07-30 DIAGNOSIS — M54.16 LUMBAR RADICULITIS: Primary | ICD-10-CM

## 2019-07-30 PROCEDURE — 3017F COLORECTAL CA SCREEN DOC REV: CPT | Performed by: ANESTHESIOLOGY

## 2019-07-30 PROCEDURE — 4004F PT TOBACCO SCREEN RCVD TLK: CPT | Performed by: ANESTHESIOLOGY

## 2019-07-30 PROCEDURE — G8427 DOCREV CUR MEDS BY ELIG CLIN: HCPCS | Performed by: ANESTHESIOLOGY

## 2019-07-30 PROCEDURE — 99214 OFFICE O/P EST MOD 30 MIN: CPT | Performed by: ANESTHESIOLOGY

## 2019-07-30 PROCEDURE — G8417 CALC BMI ABV UP PARAM F/U: HCPCS | Performed by: ANESTHESIOLOGY

## 2019-07-30 ASSESSMENT — ENCOUNTER SYMPTOMS
RESPIRATORY NEGATIVE: 1
BACK PAIN: 1
EYES NEGATIVE: 1
ALLERGIC/IMMUNOLOGIC NEGATIVE: 1

## 2019-07-30 NOTE — PROGRESS NOTES
The patient is a 48 y. o. Non-/non  male. Chief Complaint   Patient presents with    Back Pain     lower back pain for close to two years after a freezer door fell on him        HPI   Requesting physician for the evaluation of Sebastian Dobbs 1965: [place] [place]    Pain History  Low back pain located in the lumbar area across midline affect both side with radiation down right leg all the way to the foot  Associated symptoms include right leg numbness and tingling  Denies any loss of bladder or bowel control  No history of fever chills or weight loss    Onset of symptoms about 2 years ago, symptoms of progressively worsened  Patient recently completed physical therapy without any improvement in his pain  No previous lumbar spine injection history  No previous lumbar spine surgical history  In past patient of tried anti-inflammatory medications Neurontin and different opioids    Past history significant for psychiatric illness, currently taking several psych medication  History of polysubstance abuse, urine toxicology is multiple times a pain positive for cocaine and marijuana  Pain score today  7  1. Location:lower back   2. Radiation:buttocks/leg  3. Character:Numbness/throbbing/shooting/sharp/tender  5. Duration:Constant  6. Onset: about 1 1/2 years ago  9. Did an injury cause pain: yes/ freezer door fell on him  8. Aggravating factors:most activity  9. Alleviating factors:marijuana pen (vape)/pressure  10. Associated symptoms (numbness / tingling / weakness):  Yes numbness  -Where at:back to leg  -Down into finger tips or toes (specify which finger or toes):yes  -constant or intermitting: intermittant  11. Red Flags: (weight loss / chills / loss of bladder or bowel control):loss of appetita    Previous management history  1.  Previous diagnostic workup: (Imaging/EMG)   CT, MRI, or Xray: nothing in Epic/ patient stated [facility] at [facility] [place]  What part of the body:back  What

## 2019-08-04 ENCOUNTER — HOSPITAL ENCOUNTER (EMERGENCY)
Age: 54
Discharge: HOME | End: 2019-08-04
Attending: EMERGENCY MEDICINE
Payer: COMMERCIAL

## 2019-08-04 ENCOUNTER — APPOINTMENT (OUTPATIENT)
Dept: GENERAL RADIOLOGY | Age: 54
End: 2019-08-04
Payer: COMMERCIAL

## 2019-08-04 VITALS
TEMPERATURE: 98 F | WEIGHT: 200 LBS | DIASTOLIC BLOOD PRESSURE: 86 MMHG | BODY MASS INDEX: 28 KG/M2 | OXYGEN SATURATION: 98 % | SYSTOLIC BLOOD PRESSURE: 127 MMHG | HEIGHT: 71 IN | HEART RATE: 66 BPM | RESPIRATION RATE: 14 BRPM

## 2019-08-04 DIAGNOSIS — R07.9 CHEST PAIN, UNSPECIFIED TYPE: Primary | ICD-10-CM

## 2019-08-04 LAB
ABSOLUTE EOS #: 0.1 K/UL (ref 0–0.4)
ABSOLUTE IMMATURE GRANULOCYTE: ABNORMAL K/UL (ref 0–0.3)
ABSOLUTE LYMPH #: 2.3 K/UL (ref 1–4.8)
ABSOLUTE MONO #: 0.5 K/UL (ref 0.1–1.3)
ALBUMIN SERPL-MCNC: 4.1 G/DL (ref 3.5–5.2)
ALBUMIN/GLOBULIN RATIO: NORMAL (ref 1–2.5)
ALP BLD-CCNC: 93 U/L (ref 40–129)
ALT SERPL-CCNC: 18 U/L (ref 5–41)
ANION GAP SERPL CALCULATED.3IONS-SCNC: 10 MMOL/L (ref 9–17)
AST SERPL-CCNC: 26 U/L
BASOPHILS # BLD: 1 % (ref 0–2)
BASOPHILS ABSOLUTE: 0 K/UL (ref 0–0.2)
BILIRUB SERPL-MCNC: 0.73 MG/DL (ref 0.3–1.2)
BNP INTERPRETATION: NORMAL
BUN BLDV-MCNC: 17 MG/DL (ref 6–20)
BUN/CREAT BLD: NORMAL (ref 9–20)
CALCIUM SERPL-MCNC: 9.4 MG/DL (ref 8.6–10.4)
CHLORIDE BLD-SCNC: 103 MMOL/L (ref 98–107)
CO2: 27 MMOL/L (ref 20–31)
CREAT SERPL-MCNC: 0.79 MG/DL (ref 0.7–1.2)
DIFFERENTIAL TYPE: ABNORMAL
EOSINOPHILS RELATIVE PERCENT: 2 % (ref 0–4)
GFR AFRICAN AMERICAN: >60 ML/MIN
GFR NON-AFRICAN AMERICAN: >60 ML/MIN
GFR SERPL CREATININE-BSD FRML MDRD: NORMAL ML/MIN/{1.73_M2}
GFR SERPL CREATININE-BSD FRML MDRD: NORMAL ML/MIN/{1.73_M2}
GLUCOSE BLD-MCNC: 91 MG/DL (ref 70–99)
HCT VFR BLD CALC: 46.3 % (ref 41–53)
HEMOGLOBIN: 15.5 G/DL (ref 13.5–17.5)
IMMATURE GRANULOCYTES: ABNORMAL %
INR BLD: 1
LYMPHOCYTES # BLD: 39 % (ref 24–44)
MCH RBC QN AUTO: 29.4 PG (ref 26–34)
MCHC RBC AUTO-ENTMCNC: 33.5 G/DL (ref 31–37)
MCV RBC AUTO: 87.7 FL (ref 80–100)
MONOCYTES # BLD: 8 % (ref 1–7)
NRBC AUTOMATED: ABNORMAL PER 100 WBC
PARTIAL THROMBOPLASTIN TIME: 31.5 SEC (ref 24–36)
PDW BLD-RTO: 13.6 % (ref 11.5–14.9)
PLATELET # BLD: 169 K/UL (ref 150–450)
PLATELET ESTIMATE: ABNORMAL
PMV BLD AUTO: 8.8 FL (ref 6–12)
POTASSIUM SERPL-SCNC: 3.8 MMOL/L (ref 3.7–5.3)
PRO-BNP: 25 PG/ML
PROTHROMBIN TIME: 13.1 SEC (ref 11.8–14.6)
RBC # BLD: 5.28 M/UL (ref 4.5–5.9)
RBC # BLD: ABNORMAL 10*6/UL
SEG NEUTROPHILS: 50 % (ref 36–66)
SEGMENTED NEUTROPHILS ABSOLUTE COUNT: 3.1 K/UL (ref 1.3–9.1)
SODIUM BLD-SCNC: 140 MMOL/L (ref 135–144)
TOTAL PROTEIN: 7.5 G/DL (ref 6.4–8.3)
TROPONIN INTERP: NORMAL
TROPONIN INTERP: NORMAL
TROPONIN T: NORMAL NG/ML
TROPONIN T: NORMAL NG/ML
TROPONIN, HIGH SENSITIVITY: 7 NG/L (ref 0–22)
TROPONIN, HIGH SENSITIVITY: 8 NG/L (ref 0–22)
WBC # BLD: 6.1 K/UL (ref 3.5–11)
WBC # BLD: ABNORMAL 10*3/UL

## 2019-08-04 PROCEDURE — 85730 THROMBOPLASTIN TIME PARTIAL: CPT

## 2019-08-04 PROCEDURE — 80053 COMPREHEN METABOLIC PANEL: CPT

## 2019-08-04 PROCEDURE — 36415 COLL VENOUS BLD VENIPUNCTURE: CPT

## 2019-08-04 PROCEDURE — 85025 COMPLETE CBC W/AUTO DIFF WBC: CPT

## 2019-08-04 PROCEDURE — 84484 ASSAY OF TROPONIN QUANT: CPT

## 2019-08-04 PROCEDURE — 71045 X-RAY EXAM CHEST 1 VIEW: CPT

## 2019-08-04 PROCEDURE — 85610 PROTHROMBIN TIME: CPT

## 2019-08-04 PROCEDURE — 99285 EMERGENCY DEPT VISIT HI MDM: CPT

## 2019-08-04 PROCEDURE — 93005 ELECTROCARDIOGRAM TRACING: CPT | Performed by: EMERGENCY MEDICINE

## 2019-08-04 PROCEDURE — 83880 ASSAY OF NATRIURETIC PEPTIDE: CPT

## 2019-08-04 ASSESSMENT — PAIN DESCRIPTION - LOCATION: LOCATION: CHEST

## 2019-08-04 ASSESSMENT — PAIN DESCRIPTION - ORIENTATION: ORIENTATION: LEFT

## 2019-08-04 ASSESSMENT — PAIN DESCRIPTION - FREQUENCY: FREQUENCY: CONTINUOUS

## 2019-08-04 ASSESSMENT — PAIN DESCRIPTION - PAIN TYPE: TYPE: ACUTE PAIN

## 2019-08-04 ASSESSMENT — PAIN SCALES - GENERAL: PAINLEVEL_OUTOF10: 6

## 2019-08-04 NOTE — ED PROVIDER NOTES
EMERGENCY DEPARTMENT ENCOUNTER    Pt Name: Shyann Strange  MRN: [id number]  [place] 1965  Date of evaluation: 8/4/19  CHIEF COMPLAINT       Chief Complaint   Patient presents with    Chest Pain    Numbness     left arm/ hand     HISTORY OF PRESENT ILLNESS   HPI    HISTORY OF PRESENT ILLNESS:  Past medical history of COPD, schizoaffective disorder presents for chief complaint of chest pain. Patient had left-sided chest pain that is been intermittent since this evening. Patient had a more stressful day and feels that it is stress. Atypical Chest pain. No PE/DVT risk factors. No leg swelling. Chest pain was not of maximal intensity at onset, does not radiate to the back, is not associated with focal neurological deficits, is not of tearing sensation. Chest pain is non-exertional, not associated with diaphoresis, nausea, or significant shortness of breath. Severity is moderate. No aggravating or relieving factors. Timing is one day. Course is intermittent.   Context is normal cardiac catheterization in 2016  -----------------------  -----------------------  REVIEW OF SYSTEMS  ED Caveat: [none]  Gen:  No fever, no chills  CV: +CP, no palpitations  Resp: No SOB, no respiratory distress  GI: No V/D, no abd pain  : No dysuria, no increased frequency  Skin: No rash, no purulent lesions  Eyes: No blurry vision, No double vision  MSK: No back pain, no joint pain  Neuro: No HA, no sensation changes  Psych: No SI/HI  -----------------------  -----------------------  ALLERGIES  -per nursing records, reviewed    PAST MEDICAL HISTORY  -See HPI    SOCIAL HISTORY  -No daily drinking, no IV drugs  -----------------------  -----------------------  PHYSICAL EXAM  Gen: Alert, no acute distress  Skin: Warm, no rashes  Head: Normocephalic, atraumatic  Neck: No midline tenderness, no nuchal rigidity  Eye: EOMI, PERRLA, normal conjunctiva  ENT: Mucous membranes moist, no pharyngeal erythema  CV: Normal rate, no rubs  Resp: (COLACE) 100 MG CAPSULE    Take 1 capsule by mouth 2 times daily as needed for Constipation (use while taking narcotic pain meds)    ELASTIC BANDAGES & SUPPORTS (LUMBAR BACK BRACE/SUPPORT PAD) MISC    1 Units by Does not apply route daily PNEUMATIC OFFLOADING BACK BRACE    IBUPROFEN (ADVIL;MOTRIN) 800 MG TABLET    Take 1 tablet by mouth 2 times daily as needed for Pain    ONDANSETRON (ZOFRAN ODT) 4 MG DISINTEGRATING TABLET    Take 1 tablet by mouth every 8 hours as needed for Nausea    RISPERIDONE (RISPERDAL) 1 MG TABLET    Take 1 tablet by mouth daily    SIMVASTATIN (ZOCOR) 20 MG TABLET    Take 1 tablet by mouth nightly    TIZANIDINE (ZANAFLEX) 4 MG TABLET    take 1 tablet by mouth three times a day    TRAZODONE (DESYREL) 50 MG TABLET    Take 50 mg by mouth     ALLERGIES     is allergic to pcn [penicillins] and novocain [procaine]. FAMILY HISTORY     He indicated that his mother is . He indicated that his father is . SOCIAL HISTORY       Social History     Tobacco Use    Smoking status: Current Every Day Smoker     Packs/day: 0.50     Types: Cigarettes    Smokeless tobacco: Current User   Substance Use Topics    Alcohol use:  Yes     Alcohol/week: 1.0 standard drinks     Types: 1 Shots of liquor per week     Comment: occasionally    Drug use: Not Currently     Types: Cocaine     Comment: Stopped years ago     PHYSICAL EXAM     INITIAL VITALS: /86   Pulse 66   Temp 98 °F (36.7 °C) (Oral)   Resp 14   Ht 5' 11\" (1.803 m)   Wt 200 lb (90.7 kg)   SpO2 98%   BMI 27.89 kg/m²    Physical Exam    MEDICAL DECISION MAKING:            Labs Reviewed   CBC WITH AUTO DIFFERENTIAL - Abnormal; Notable for the following components:       Result Value    Monocytes 8 (*)     All other components within normal limits   COMPREHENSIVE METABOLIC PANEL W/ REFLEX TO MG FOR LOW K   PROTIME-INR   APTT   BRAIN NATRIURETIC PEPTIDE   TROPONIN   TROPONIN     EMERGENCY DEPARTMENTCOURSE:         Vitals:

## 2019-08-05 ENCOUNTER — TELEPHONE (OUTPATIENT)
Dept: FAMILY MEDICINE CLINIC | Age: 54
End: 2019-08-05

## 2019-08-05 LAB
EKG ATRIAL RATE: 66 BPM
EKG P AXIS: 75 DEGREES
EKG P-R INTERVAL: 184 MS
EKG Q-T INTERVAL: 388 MS
EKG QRS DURATION: 76 MS
EKG QTC CALCULATION (BAZETT): 406 MS
EKG R AXIS: 38 DEGREES
EKG T AXIS: 46 DEGREES
EKG VENTRICULAR RATE: 66 BPM

## 2019-08-05 PROCEDURE — 93010 ELECTROCARDIOGRAM REPORT: CPT | Performed by: INTERNAL MEDICINE

## 2019-08-10 ENCOUNTER — HOSPITAL ENCOUNTER (OUTPATIENT)
Dept: MRI IMAGING | Age: 54
Discharge: HOME | End: 2019-08-12
Payer: COMMERCIAL

## 2019-08-10 DIAGNOSIS — M54.16 LUMBAR RADICULITIS: ICD-10-CM

## 2019-08-10 DIAGNOSIS — M51.26 LUMBAR DISC HERNIATION: ICD-10-CM

## 2019-08-10 PROCEDURE — 72148 MRI LUMBAR SPINE W/O DYE: CPT

## 2019-10-11 ENCOUNTER — TELEPHONE (OUTPATIENT)
Dept: FAMILY MEDICINE CLINIC | Age: 54
End: 2019-10-11

## 2020-02-14 ENCOUNTER — TELEPHONE (OUTPATIENT)
Dept: FAMILY MEDICINE CLINIC | Age: 55
End: 2020-02-14

## 2020-02-18 NOTE — TELEPHONE ENCOUNTER
PC from pt, pt asked for name of Neurosurgeon he saw at [address] looked up in care everywhere     Lalito Dykes MD; given office phone number.      Pt also states that he doesn't understand what PCP meant in regards to \"I am unable to make that determination and do not feel comfortable speculating on it\" and asked if he can speak to MD personally,     Informed that MD not in office until tomorrow after 1:30 but that writer will ask if MD is able to call pt and explain further what was meant     Sobeida Castrejon

## 2020-03-13 ENCOUNTER — OFFICE VISIT (OUTPATIENT)
Dept: PRIMARY CARE CLINIC | Age: 55
End: 2020-03-13
Payer: COMMERCIAL

## 2020-03-13 VITALS
DIASTOLIC BLOOD PRESSURE: 77 MMHG | WEIGHT: 183 LBS | OXYGEN SATURATION: 98 % | HEART RATE: 75 BPM | SYSTOLIC BLOOD PRESSURE: 131 MMHG | TEMPERATURE: 98.1 F | BODY MASS INDEX: 25.52 KG/M2

## 2020-03-13 PROCEDURE — G8417 CALC BMI ABV UP PARAM F/U: HCPCS | Performed by: NURSE PRACTITIONER

## 2020-03-13 PROCEDURE — 3017F COLORECTAL CA SCREEN DOC REV: CPT | Performed by: NURSE PRACTITIONER

## 2020-03-13 PROCEDURE — G8427 DOCREV CUR MEDS BY ELIG CLIN: HCPCS | Performed by: NURSE PRACTITIONER

## 2020-03-13 PROCEDURE — 4004F PT TOBACCO SCREEN RCVD TLK: CPT | Performed by: NURSE PRACTITIONER

## 2020-03-13 PROCEDURE — 99214 OFFICE O/P EST MOD 30 MIN: CPT | Performed by: NURSE PRACTITIONER

## 2020-03-13 PROCEDURE — G8484 FLU IMMUNIZE NO ADMIN: HCPCS | Performed by: NURSE PRACTITIONER

## 2020-03-13 ASSESSMENT — ENCOUNTER SYMPTOMS
COUGH: 0
CHEST TIGHTNESS: 0
ABDOMINAL PAIN: 0
BACK PAIN: 1
NAUSEA: 0
CONSTIPATION: 0
DIARRHEA: 0
SHORTNESS OF BREATH: 0

## 2020-03-13 NOTE — PROGRESS NOTES
[facility]  [address], [address]  [phone]    Kimberly Howard is a 48 y.o. male who presents today for his  medical conditions/complaintsas noted below. Kimberly Howard is c/o of Establish Care and Back Pain    HPI:     HPI  Pt is here to establish. Chronic back pain. Has seen NS, was sent to pt. Looks like he completed some pt- states it got better for a little bit and then started hurting again about a month after he was done. Pain management wanted to give him injections but it didn't work for his girlfriend. He went to ns at [facility]. He is using marijuana for the pain. He states this problem happened from an injury in group home. His ultimate goal is to have a doctor who signs a letter stating that the injury occurred in group home. He is willing to go see a second neurosurgeon to get their opinion. Diet- poor- \"anything he can get his hands on\"  Exercise- no  Dentist- long time  Incarcerated for 18 months- home for a year. Declines flu shot  No longer sees psych. Nursing note reviewedand discussed with patient. Patient'smedications, allergies, past medical, surgical, social and family histories werereviewed and updated as appropriate. No current outpatient medications on file prior to visit. No current facility-administered medications on file prior to visit.       Past Medical History:   Diagnosis Date    Bipolar 1 disorder ([facility] [address]     COPD [facility]ch[facility]) [facility]     Depression     Disc herniation     Duodenal ulcer 2015    PAD (peripheral artery disease) ([facility])     Tobacco abuse       Past Surgical History:   Procedure Laterality Date    CARDIAC CATHETERIZATION  11/07/2016    ULNAR TUNNEL RELEASE Left     UPPER GASTROINTESTINAL ENDOSCOPY  11/09/15    duodenal ulcer    UPPER GASTROINTESTINAL ENDOSCOPY  1/26/16     Family History   Problem Relation Age of Onset    Breast Cancer Mother    [facility] Breath sounds: Normal breath sounds. Abdominal:      General: Bowel sounds are normal.      Palpations: Abdomen is soft. Musculoskeletal: Normal range of motion. Comments: Pain free ROM     Skin:     General: Skin is warm and dry. Findings: No rash. Neurological:      Mental Status: He is alert and oriented to person, place, and time. Comments: Gait normal     Psychiatric:         Behavior: Behavior normal.         Thought Content:  Thought content normal.         Judgment: Judgment normal.       Lab Results   Component Value Date    WBC 6.1 08/04/2019    HGB 15.5 08/04/2019    HCT 46.3 08/04/2019    MCV 87.7 08/04/2019     08/04/2019     Lab Results   Component Value Date     08/04/2019    K 3.8 08/04/2019     08/04/2019    CO2 27 08/04/2019    BUN 17 08/04/2019    CREATININE 0.79 08/04/2019    GLUCOSE 91 08/04/2019    CALCIUM 9.4 08/04/2019    PROT 7.5 08/04/2019    LABALBU 4.1 08/04/2019    BILITOT 0.73 08/04/2019    ALKPHOS 93 08/04/2019    AST 26 08/04/2019    ALT 18 08/04/2019    LABGLOM >60 08/04/2019    GFRAA >60 08/04/2019    GLOB NOT REPORTED 08/23/2016       Lab Results   Component Value Date    CHOL 248 (H) 02/08/2019    CHOL 216 (H) 08/23/2016    CHOL 188 01/26/2015     Lab Results   Component Value Date    TRIG 211 (H) 02/08/2019    TRIG 95 08/23/2016    TRIG 152 (H) 01/26/2015     Lab Results   Component Value Date    HDL 44 02/08/2019    HDL 74 08/23/2016    HDL 50 01/26/2015     Lab Results   Component Value Date    LDLCHOLESTEROL 162 (H) 02/08/2019    LDLCHOLESTEROL 123 08/23/2016    LDLCHOLESTEROL 108 01/26/2015     Lab Results   Component Value Date    VLDL NOT REPORTED 02/08/2019    VLDL NOT REPORTED 08/23/2016    VLDL NOT REPORTED 01/26/2015     Lab Results   Component Value Date    CHOLHDLRATIO 5.6 (H) 02/08/2019    CHOLHDLRATIO 2.9 08/23/2016    CHOLHDLRATIO 3.8 01/26/2015     The 10-year ASCVD risk score (Abby Randolph et al., 2013) is: 11.4% Values used to calculate the score:      Age: 48 years      Sex: Male      Is Non- : Yes      Diabetic: No      Tobacco smoker: Yes      Systolic Blood Pressure: 658 mmHg      Is BP treated: No      HDL Cholesterol: 44 mg/dL      Total Cholesterol: 248 mg/dL    Stopped simvastatin on his own. Assessment/Plan         1. Schizoaffective disorder, bipolar type ([facility] [address]  Stable     2. Screening for colon cancer  Will do cologuard     3. Chronic low back pain, unspecified back pain laterality, unspecified whether sciatica present    - Amalia [address], Malaika, DO, Neurosurgery, [address]  - [facility]    4. Screening for malignant neoplasm of colon    - Cologuard (For External Results Only); Future    5. Encounter to establish care      RTO if symptoms worsen or fail to improve  Pt agreeable with plan      Patient given educationalmaterials - see patient instructions. Discussed use, benefit, and side effectsof prescribed medications. All patient questions answered. Pt voiced understanding. Reviewed health maintenance. Instructed to continue current medications, diet andexercise. 1.  Jamil received counseling on the following healthy behaviors: nutrition, exercise and medication adherence  2. Patient given educational materials when available - see patient instructionswhen applicable  3. Discussed use, benefit, and side effects of prescribed medications. Barriersto medication compliance addressed. All patient questions answered. Pt voicedunderstanding. 4.  Reviewed prior labs and health maintenance  5. Continuecurrent medications, diet and exercise. Completed Refills   Requested Prescriptions      No prescriptions requested or ordered in this encounter         This note was transcribed using dictation with Dragon services. Efforts were made to correct any errors but some words may be misinterpreted.     Electronically signed by Shannan Velez

## 2020-05-07 ENCOUNTER — OFFICE VISIT (OUTPATIENT)
Dept: NEUROSURGERY | Age: 55
End: 2020-05-07
Payer: COMMERCIAL

## 2020-05-07 VITALS
DIASTOLIC BLOOD PRESSURE: 82 MMHG | WEIGHT: 189 LBS | HEART RATE: 102 BPM | OXYGEN SATURATION: 95 % | HEIGHT: 71 IN | TEMPERATURE: 99.3 F | BODY MASS INDEX: 26.46 KG/M2 | SYSTOLIC BLOOD PRESSURE: 142 MMHG

## 2020-05-07 PROCEDURE — 99203 OFFICE O/P NEW LOW 30 MIN: CPT | Performed by: NEUROLOGICAL SURGERY

## 2020-05-11 ENCOUNTER — TELEPHONE (OUTPATIENT)
Dept: NEUROSURGERY | Age: 55
End: 2020-05-11

## 2020-05-11 NOTE — TELEPHONE ENCOUNTER
Suleiman Farah think the patient has misunderstood me. I can not say for certain that his injury caused the disk bulges. He has several budges that are similar in appearance as part of normal aging. It is possible the injury may have provoked his pain. He does have come mild narrowing of spinal canal which can cause nerve pinching, but again it looks indistinguishable from the normal wear and tear and degeneration with aging. We are awaiting an EMG to access if there are truly nerve pinching/damage.

## 2020-09-14 ENCOUNTER — OFFICE VISIT (OUTPATIENT)
Dept: NEUROSURGERY | Age: 55
End: 2020-09-14
Payer: COMMERCIAL

## 2020-09-14 VITALS
DIASTOLIC BLOOD PRESSURE: 88 MMHG | BODY MASS INDEX: 25.1 KG/M2 | HEART RATE: 79 BPM | WEIGHT: 180 LBS | SYSTOLIC BLOOD PRESSURE: 129 MMHG

## 2020-09-14 PROCEDURE — 4004F PT TOBACCO SCREEN RCVD TLK: CPT | Performed by: NEUROLOGICAL SURGERY

## 2020-09-14 PROCEDURE — G8427 DOCREV CUR MEDS BY ELIG CLIN: HCPCS | Performed by: NEUROLOGICAL SURGERY

## 2020-09-14 PROCEDURE — 99214 OFFICE O/P EST MOD 30 MIN: CPT | Performed by: NEUROLOGICAL SURGERY

## 2020-09-14 PROCEDURE — 3017F COLORECTAL CA SCREEN DOC REV: CPT | Performed by: NEUROLOGICAL SURGERY

## 2020-09-14 PROCEDURE — G8417 CALC BMI ABV UP PARAM F/U: HCPCS | Performed by: NEUROLOGICAL SURGERY

## 2020-09-21 ENCOUNTER — OFFICE VISIT (OUTPATIENT)
Dept: PRIMARY CARE CLINIC | Age: 55
End: 2020-09-21
Payer: COMMERCIAL

## 2020-09-21 ENCOUNTER — HOSPITAL ENCOUNTER (OUTPATIENT)
Age: 55
Setting detail: SPECIMEN
Discharge: HOME | End: 2020-09-21
Payer: COMMERCIAL

## 2020-09-21 VITALS
BODY MASS INDEX: 25.2 KG/M2 | OXYGEN SATURATION: 98 % | HEART RATE: 68 BPM | WEIGHT: 180 LBS | SYSTOLIC BLOOD PRESSURE: 116 MMHG | DIASTOLIC BLOOD PRESSURE: 74 MMHG | HEIGHT: 71 IN | TEMPERATURE: 97.4 F

## 2020-09-21 PROCEDURE — 4004F PT TOBACCO SCREEN RCVD TLK: CPT | Performed by: INTERNAL MEDICINE

## 2020-09-21 PROCEDURE — 99202 OFFICE O/P NEW SF 15 MIN: CPT | Performed by: INTERNAL MEDICINE

## 2020-09-21 PROCEDURE — 3017F COLORECTAL CA SCREEN DOC REV: CPT | Performed by: INTERNAL MEDICINE

## 2020-09-21 PROCEDURE — G8427 DOCREV CUR MEDS BY ELIG CLIN: HCPCS | Performed by: INTERNAL MEDICINE

## 2020-09-21 PROCEDURE — G8417 CALC BMI ABV UP PARAM F/U: HCPCS | Performed by: INTERNAL MEDICINE

## 2020-09-21 ASSESSMENT — ENCOUNTER SYMPTOMS
SORE THROAT: 1
ABDOMINAL PAIN: 1
NAUSEA: 1
COUGH: 1
FLU SYMPTOMS: 1

## 2020-09-21 NOTE — PROGRESS NOTES
[address] [facility] [place] [address]  Dept: [phone]  Dept Fax: [phone]    Oli Espinoza is a 50 y.o. male who presents to the urgent care today for his medicalconditions/complaints as noted below. Oli Espinoza is c/o of Cough (ALL SYMOPTOMS started thurs ); Pharyngitis; and Nausea      HPI:     Influenza   This is a new problem. The current episode started in the past 7 days. The problem occurs constantly. The problem has been unchanged. Associated symptoms include abdominal pain, coughing, fatigue, nausea and a sore throat. Nothing aggravates the symptoms. He has tried nothing for the symptoms. The treatment provided no relief. On know exposure to COVID. Past Medical History:   Diagnosis Date    [facility] ([facility] [address]     COPD [facility]chronic obstructive pulmonary disease) ([facility] [address]     Depression     Disc herniation     Duodenal ulcer 2015    [facility]AD [facility]peripheral [facility] disease) ([facility]     Tobacco abuse         No current outpatient medications on file. No current facility-administered medications for this visit. Allergies   Allergen Reactions    Pcn [Penicillins]     Novocain [Procaine]        Health Maintenance   Topic Date Due    Pneumococcal 0-64 years Vaccine (1 of 1 - PPSV23) 04/06/1973    HIV screen  04/06/1982    Colon Cancer Screen FIT/FOBT  04/06/2017    Shingles Vaccine (1 of 2) 04/06/2017    A1C test (Diabetic or Prediabetic)  02/06/2020    Flu vaccine (1) 09/01/2020    Lipid screen  02/08/2024    DTaP/Tdap/Td vaccine (2 - Td) 02/06/2029    Hepatitis A vaccine  Aged Out    Hepatitis B vaccine  Aged Out    Hib vaccine  Aged Out    Meningococcal (ACWY) vaccine  Aged Out       Subjective:      Review of Systems   Constitutional: Positive for fatigue. HENT: Positive for sore throat. Respiratory: Positive for cough. Gastrointestinal: Positive for abdominal pain and nausea.    All other systems reviewed and are negative. Objective:     Physical Exam  Vitals signs reviewed. Constitutional:       Appearance: Normal appearance. HENT:      Head: Normocephalic and atraumatic. Skin:     General: Skin is warm and dry. Neurological:      General: No focal deficit present. Mental Status: He is alert and oriented to person, place, and time. Psychiatric:         Mood and Affect: Mood normal.         Behavior: Behavior normal.       /74 (Site: Left Upper Arm, Position: Sitting, Cuff Size: Medium Adult)   Pulse 68   Temp 97.4 °F (36.3 °C) (Oral)   Ht 5' 11\" (1.803 m)   Wt 180 lb (81.6 kg)   SpO2 98%   BMI 25.10 kg/m²       Assessment:       Diagnosis Orders   1. Flu-like symptoms  COVID-19 Ambulatory       Plan:      No follow-ups on file. No orders of the defined types were placed in this encounter. Orders Placed This Encounter   Procedures    COVID-19 Ambulatory     Standing Status:   Future     Standing Expiration Date:   9/21/2021     Scheduling Instructions:      Saline media preferred given current shortage of viral transport media but both acceptable     Order Specific Question:   Is this test for diagnosis or screening? Answer:   Diagnosis of ill patient     Order Specific Question:   Symptomatic for COVID-19 as defined by CDC? Answer:   Yes     Order Specific Question:   Date of Symptom Onset     Answer:   9/17/2020     Order Specific Question:   Hospitalized for COVID-19? Answer:   No     Order Specific Question:   Admitted to ICU for COVID-19? Answer:   No     Order Specific Question:   Employed in healthcare setting? Answer:   No     Order Specific Question:   Resident in a congregate (group) care setting? Answer:   No     Order Specific Question:   Pregnant: Answer:   No            Patient given educational materials - see patient instructions. Discussed use, benefit, and side effects of prescribed medications. All patientquestions answered.   Pt voiced understanding.     Electronically signed by Estefany [address]erta MD on 9/21/2020at 9:16 AM

## 2020-09-21 NOTE — LETTER
NOTIFICATION RETURN TO WORK / SCHOOL    9/21/2020    [address] [place] [id number]      To Whom It May Concern:    Rusty Meyers was tested for COVID-19 on 9/21, and the result is pending a final result results take 5 to 7 days . He may return to work when he receives a negative covid result. I recommend:return without restrictions  The above restrictions are in effect for until the final covid test is back and is negative week(s). If there are questions or concerns, please have the patient contact our office.         Sincerely,      Indu Cortez RN

## 2020-09-22 ENCOUNTER — APPOINTMENT (OUTPATIENT)
Dept: GENERAL RADIOLOGY | Age: 55
End: 2020-09-22
Payer: COMMERCIAL

## 2020-09-22 ENCOUNTER — HOSPITAL ENCOUNTER (EMERGENCY)
Age: 55
Discharge: HOME | End: 2020-09-22
Attending: EMERGENCY MEDICINE
Payer: COMMERCIAL

## 2020-09-22 VITALS
HEART RATE: 94 BPM | WEIGHT: 180 LBS | TEMPERATURE: 97.6 F | HEIGHT: 71 IN | SYSTOLIC BLOOD PRESSURE: 112 MMHG | OXYGEN SATURATION: 96 % | RESPIRATION RATE: 16 BRPM | DIASTOLIC BLOOD PRESSURE: 65 MMHG | BODY MASS INDEX: 25.2 KG/M2

## 2020-09-22 PROCEDURE — 71045 X-RAY EXAM CHEST 1 VIEW: CPT

## 2020-09-22 PROCEDURE — 6370000000 HC RX 637 (ALT 250 FOR IP): Performed by: STUDENT IN AN ORGANIZED HEALTH CARE EDUCATION/TRAINING PROGRAM

## 2020-09-22 PROCEDURE — 99283 EMERGENCY DEPT VISIT LOW MDM: CPT

## 2020-09-22 RX ORDER — DEXTROMETHORPHAN POLISTIREX 30 MG/5ML
30 SUSPENSION ORAL ONCE
Status: COMPLETED | OUTPATIENT
Start: 2020-09-22 | End: 2020-09-22

## 2020-09-22 RX ORDER — ALBUTEROL SULFATE 90 UG/1
1-2 AEROSOL, METERED RESPIRATORY (INHALATION) EVERY 4 HOURS PRN
Qty: 1 INHALER | Refills: 0 | Status: SHIPPED | OUTPATIENT
Start: 2020-09-22

## 2020-09-22 RX ORDER — DEXTROMETHORPHAN POLISTIREX 30 MG/5ML
60 SUSPENSION ORAL 2 TIMES DAILY PRN
Qty: 148 ML | Refills: 0 | Status: SHIPPED | OUTPATIENT
Start: 2020-09-22 | End: 2020-10-02

## 2020-09-22 RX ORDER — IBUPROFEN 800 MG/1
800 TABLET ORAL EVERY 8 HOURS PRN
Qty: 21 TABLET | Refills: 0 | Status: SHIPPED | OUTPATIENT
Start: 2020-09-22 | End: 2020-09-29

## 2020-09-22 RX ORDER — ACETAMINOPHEN 500 MG
1000 TABLET ORAL ONCE
Status: COMPLETED | OUTPATIENT
Start: 2020-09-22 | End: 2020-09-22

## 2020-09-22 RX ORDER — ONDANSETRON 4 MG/1
4 TABLET, FILM COATED ORAL EVERY 8 HOURS PRN
Qty: 10 TABLET | Refills: 0 | Status: SHIPPED | OUTPATIENT
Start: 2020-09-22

## 2020-09-22 RX ORDER — ACETAMINOPHEN 500 MG
1000 TABLET ORAL 3 TIMES DAILY
Qty: 42 TABLET | Refills: 0 | Status: SHIPPED | OUTPATIENT
Start: 2020-09-22 | End: 2020-09-29

## 2020-09-22 RX ADMIN — ACETAMINOPHEN 1000 MG: 500 TABLET ORAL at 02:42

## 2020-09-22 RX ADMIN — Medication 30 MG: at 04:17

## 2020-09-22 ASSESSMENT — PAIN DESCRIPTION - FREQUENCY: FREQUENCY: INTERMITTENT

## 2020-09-22 ASSESSMENT — PAIN SCALES - GENERAL
PAINLEVEL_OUTOF10: 7
PAINLEVEL_OUTOF10: 7

## 2020-09-22 ASSESSMENT — PAIN DESCRIPTION - PAIN TYPE: TYPE: ACUTE PAIN

## 2020-09-22 ASSESSMENT — ENCOUNTER SYMPTOMS
COUGH: 1
DIARRHEA: 1
NAUSEA: 1
ABDOMINAL PAIN: 0
VOMITING: 0
SHORTNESS OF BREATH: 0
RHINORRHEA: 0

## 2020-09-22 ASSESSMENT — PAIN DESCRIPTION - ONSET: ONSET: ON-GOING

## 2020-09-22 ASSESSMENT — PAIN DESCRIPTION - LOCATION: LOCATION: CHEST

## 2020-09-22 ASSESSMENT — PAIN DESCRIPTION - DESCRIPTORS: DESCRIPTORS: BURNING

## 2020-09-22 ASSESSMENT — PAIN DESCRIPTION - ORIENTATION: ORIENTATION: MID

## 2020-09-22 NOTE — ED NOTES
Pt presents to ED ambulatory a&o x4.pt comes with complaints of cough and nasal congestion since Thursday. Pt states it started with nasal congestion but seems to be getting worse. Pt went to urgent care and was tested for COVID but will not get results for a few days. Pt states he just feels worse.       Susan Tejeda RN  09/22/20 [id number]

## 2020-09-22 NOTE — ED PROVIDER NOTES
Social Needs    Financial resource strain: Not on file    Food insecurity     Worry: Not on file     Inability: Not on file    Transportation needs     Medical: Not on file     Non-medical: Not on file   Tobacco Use    Smoking status: Current Every Day Smoker     Packs/day: 0.50     Years: 20.00     Pack years: 10.00     Types: Cigarettes    Smokeless tobacco: Never Used   Substance and Sexual Activity    Alcohol use: Yes     Alcohol/week: 1.0 standard drinks     Types: 1 Shots of liquor per week     Comment: social    Drug use: Yes     Types: Marijuana, Cocaine     Comment: pt states it helps     Sexual activity: Yes   Lifestyle    Physical activity     Days per week: Not on file     Minutes per session: Not on file    Stress: Not on file   Relationships    Social connections     Talks on phone: Not on file     Gets together: Not on file     Attends [employer] service: Not on file     Active member of club or organization: Not on file     Attends meetings of clubs or organizations: Not on file     Relationship status: Not on file    Intimate partner violence     Fear of current or ex partner: Not on file     Emotionally abused: Not on file     Physically abused: Not on file     Forced sexual activity: Not on file   Other Topics Concern    Not on file   Social History Narrative    Not on file       Family History   Problem Relation Age of Onset    Breast Cancer Mother     Other Father         GSW       Allergies:    Pcn [penicillins] and Novocain [procaine]    Home Medications:  Prior to Admission medications    Medication Sig Start Date End Date Taking?  Authorizing Provider   acetaminophen (TYLENOL) 500 MG tablet Take 2 tablets by mouth 3 times daily for 7 days 9/22/20 9/29/20 Yes Hoda Zapaat MD   ibuprofen (IBU) 800 MG tablet Take 1 tablet by mouth every 8 hours as needed for Pain 9/22/20 9/29/20 Yes Hoda Zapata MD   ondansetron (ZOFRAN) 4 MG tablet Take 1 tablet by mouth every 8 Effort: Pulmonary effort is normal. No tachypnea or respiratory distress. Breath sounds: No stridor. Examination of the right-lower field reveals rhonchi. Examination of the left-lower field reveals rhonchi. Rhonchi present. No decreased breath sounds. Abdominal:      Palpations: Abdomen is soft. Tenderness: There is no abdominal tenderness. Lymphadenopathy:      Cervical: Cervical adenopathy present. Skin:     General: Skin is warm and dry. Capillary Refill: Capillary refill takes less than 2 seconds. Neurological:      General: No focal deficit present. Mental Status: He is alert and oriented to person, place, and time. Psychiatric:         Behavior: Behavior is cooperative. DIFFERENTIAL  DIAGNOSIS   PLAN (LABS / IMAGING / EKG):  Orders Placed This Encounter   Procedures    XR CHEST PORTABLE       MEDICATIONS ORDERED:  Orders Placed This Encounter   Medications    acetaminophen (TYLENOL) tablet 1,000 mg    acetaminophen (TYLENOL) 500 MG tablet     Sig: Take 2 tablets by mouth 3 times daily for 7 days     Dispense:  42 tablet     Refill:  0    ibuprofen (IBU) 800 MG tablet     Sig: Take 1 tablet by mouth every 8 hours as needed for Pain     Dispense:  21 tablet     Refill:  0    ondansetron (ZOFRAN) 4 MG tablet     Sig: Take 1 tablet by mouth every 8 hours as needed for Nausea     Dispense:  10 tablet     Refill:  0    albuterol sulfate HFA (PROVENTIL HFA) 108 (90 Base) MCG/ACT inhaler     Sig: Inhale 1-2 puffs into the lungs every 4 hours as needed for Wheezing or Shortness of Breath (Space out to every 6 hours as symptoms improve) Space out to every 6 hours as symptoms improve.      Dispense:  1 Inhaler     Refill:  0    dextromethorphan (DELSYM) 30 MG/5ML extended release liquid 30 mg    dextromethorphan (DELSYM) 30 MG/5ML extended release liquid     Sig: Take 10 mLs by mouth 2 times daily as needed for Cough     Dispense:  148 mL     Refill:  0         DIAGNOSTIC discharge    MDM  Number of Diagnoses or Management Options  Suspected COVID-19 virus infection: new, needed workup     Amount and/or Complexity of Data Reviewed  Tests in the radiology section of CPT®: ordered and reviewed  Review and summarize past medical records: yes  Discuss the patient with other providers: yes  Independent visualization of images, tracings, or specimens: yes    Risk of Complications, Morbidity, and/or Mortality  Presenting problems: low  Diagnostic procedures: low  Management options: low    Patient Progress  Patient progress: stable      PROCEDURES:  none    CONSULTS:  None    CRITICAL CARE:  Please see attending note    FINAL IMPRESSION     1. Suspected COVID-19 virus infection         DISPOSITION / PLAN   DISPOSITION Decision To Discharge 09/22/2020 03:55:23 AM      Evaluation and treatment course in the ED, and plan of care upon discharge was discussed in length with the patient. Patient had no further questions prior to being discharged and was instructed to return to the ED for new or worsening symptoms. Any changes to existing medications or new prescriptions were reviewed with patient and they expressed understanding of how to correctly take their medications and the possible side effects. PATIENT REFERRED TO:  Janett Alexander MD  [place] [address]  [phone]    Schedule an appointment as soon as possible for a visit   As needed    [facility]  [address] [place] [address]  [phone]    As needed      DISCHARGE MEDICATIONS:  New Prescriptions    ACETAMINOPHEN (TYLENOL) 500 MG TABLET    Take 2 tablets by mouth 3 times daily for 7 days    ALBUTEROL SULFATE HFA (PROVENTIL HFA) 108 (90 BASE) MCG/ACT INHALER    Inhale 1-2 puffs into the lungs every 4 hours as needed for Wheezing or Shortness of Breath (Space out to every 6 hours as symptoms improve) Space out to every 6 hours as symptoms improve.     DEXTROMETHORPHAN (DELSYM) 30 MG/5ML EXTENDED RELEASE LIQUID    Take 10 mLs by mouth 2 times daily as needed for Cough    IBUPROFEN (IBU) 800 MG TABLET    Take 1 tablet by mouth every 8 hours as needed for Pain    ONDANSETRON (ZOFRAN) 4 MG TABLET    Take 1 tablet by mouth every 8 hours as needed for Nausea       Britney Ferrer DO  Emergency Medicine Resident Physician, PGY-2    (Please note that portions of this note were completed with a voice recognition program.  Efforts were made to edit the dictations but occasionally words are mis-transcribed.)         Britney Ferrer MD  09/22/20 [phone]

## 2020-09-22 NOTE — ED PROVIDER NOTES
[facility]  Emergency Department  Faculty Attestation       I performed a history and physical examination of the patient and discussed management with the resident. I reviewed the residents note and agree with the documented findings including all diagnostic interpretations and plan of care. Any areas of disagreement are noted on the chart. I was personally present for the key portions of any procedures. I have documented in the chart those procedures where I was not present during the key portions. I have reviewed the emergency nurses triage note. I agree with the chief complaint, past medical history, past surgical history, allergies, medications, social and family history as documented unless otherwise noted below. Documentation of the HPI, Physical Exam and Medical Decision Making performed by scribronald is based on my personal performance of the HPI, PE and MDM. For Physician Assistant/ Nurse Practitioner cases/documentation I have personally evaluated this patient and have completed at least one if not all key elements of the E/M (history, physical exam, and MDM). Additional findings are as noted. Pertinent Comments     Primary Care Physician: Zia Joy MD    ED Triage Vitals   BP Temp Temp Source Pulse Resp SpO2 Height Weight   09/22/20 0209 09/22/20 0201 09/22/20 0201 09/22/20 0209 09/22/20 0209 09/22/20 0209 09/22/20 0209 09/22/20 0209   112/65 97.6 °F (36.4 °C) Oral 94 16 96 % 5' 11\" (1.803 m) 180 lb (81.6 kg)        History/Physical: This is a 50 y.o. male who presents to the Emergency Department with complaint of cough and shortenss of breath. Has already been tested for CoVID awaiting results. Given high suspicion of CoVID-19 infection, I did evaluate patient from a 6 foot distance in order to help conserve PPE. Detailed heart and lung exam was performed by resident physician. On my exam is talking in full sentences with no difficulty. Alert and oriented.   Moving all tremors equally. Does not appear to be in acute distress. MDM/Plan: Viral-like illness, already tested for CoVID-19. Will get chest x-ray. Symptomatic treatment. He is maintaining his oxygen saturations and his pulse rate even with exertion. Will discharge home.   Quarantine instructions       CRITICAL CARE: None     Jackelyn Hawk MD  Attending Emergency Physician         Jackelyn Hawk MD  09/22/20 9624

## 2020-09-23 ENCOUNTER — CARE COORDINATION (OUTPATIENT)
Dept: CARE COORDINATION | Age: 55
End: 2020-09-23

## 2020-09-23 LAB — SARS-COV-2, NAA: NOT DETECTED

## 2020-09-23 NOTE — CARE COORDINATION
Author provided contact information for future reference. Patient/family/caregiver given information for [employer] and agrees to enroll -no  Patient's preferred e-mail: n/a   Patient's preferred phone number: n/a  Based on Loop alert triggers, patient will be contacted by nurse care manager for worsening symptoms. Pt states he is feeling much better and was able to get his new scripts from the pharmacy. Writer advised pt to use Proventil inhaler as prescribed. Pt is asking for a back to work note. Writer advised him to follow up with PCP to get the work note.

## 2020-09-24 ENCOUNTER — OFFICE VISIT (OUTPATIENT)
Dept: NEUROLOGY | Age: 55
End: 2020-09-24
Payer: COMMERCIAL

## 2020-09-24 VITALS
BODY MASS INDEX: 25.2 KG/M2 | WEIGHT: 180 LBS | SYSTOLIC BLOOD PRESSURE: 126 MMHG | OXYGEN SATURATION: 99 % | DIASTOLIC BLOOD PRESSURE: 88 MMHG | TEMPERATURE: 97.1 F | HEART RATE: 79 BPM | HEIGHT: 71 IN

## 2020-09-24 PROCEDURE — G8417 CALC BMI ABV UP PARAM F/U: HCPCS | Performed by: STUDENT IN AN ORGANIZED HEALTH CARE EDUCATION/TRAINING PROGRAM

## 2020-09-24 PROCEDURE — 99205 OFFICE O/P NEW HI 60 MIN: CPT | Performed by: STUDENT IN AN ORGANIZED HEALTH CARE EDUCATION/TRAINING PROGRAM

## 2020-09-24 PROCEDURE — 3017F COLORECTAL CA SCREEN DOC REV: CPT | Performed by: STUDENT IN AN ORGANIZED HEALTH CARE EDUCATION/TRAINING PROGRAM

## 2020-09-24 PROCEDURE — G8427 DOCREV CUR MEDS BY ELIG CLIN: HCPCS | Performed by: STUDENT IN AN ORGANIZED HEALTH CARE EDUCATION/TRAINING PROGRAM

## 2020-09-24 PROCEDURE — 4004F PT TOBACCO SCREEN RCVD TLK: CPT | Performed by: STUDENT IN AN ORGANIZED HEALTH CARE EDUCATION/TRAINING PROGRAM

## 2020-09-24 ASSESSMENT — ENCOUNTER SYMPTOMS
COUGH: 0
EYE PAIN: 0
ABDOMINAL PAIN: 0
NAUSEA: 0
DIARRHEA: 0
PHOTOPHOBIA: 0
VOMITING: 0
SHORTNESS OF BREATH: 0
BACK PAIN: 1
CONSTIPATION: 0

## 2020-09-24 NOTE — PROGRESS NOTES
Department of Neurosurgery                                                      Follow up visit      History Obtained From:  patient    CHIEF COMPLAINT:         Chief Complaint   Patient presents with    Follow-up     Lumbar spondylosis    Results     EMG. Pt states left leg has been asleep since EMG. HISTORY OF PRESENT ILLNESS:       The patient is a 50 y.o. male who presents for follow up for multilevel disc degeneration with back pain and radicular symptoms with pain and paresthesia that radiates down his right leg. His MRI shows some stenosis but relatively mild. That hasn't gotten better since his last visit.  He did get  EMG 8/13 \"mild sensorimotor   neuropathy affecting the lower extremities. [place] is no evidence for   lumbosacral radiculopathy    PAST MEDICAL HISTORY :       Past Medical History:        Diagnosis Date    Bipolar 1 disorder ([facility] [address]     COPD (chronic obstructive pulmonary disease) ([facility] [address] [facility]     Depression     Disc herniation     Duodenal ulcer 2015    [facility]     Tobacco abuse        Past Surgical History:        Procedure Laterality Date    CARDIAC CATHETERIZATION  11/07/2016    ULNAR TUNNEL RELEASE Left     UPPER GASTROINTESTINAL ENDOSCOPY  11/09/15    duodenal ulcer    UPPER GASTROINTESTINAL ENDOSCOPY  1/26/16       Social History:   Social History     Socioeconomic History    Marital status:      Spouse name: Not on file    Number of children: Not on file    Years of education: Not on file    Highest education level: Not on file   Occupational History    Not on file   Social Needs    Financial resource strain: Not on file    Food insecurity     Worry: Not on file     Inability: Not on file    Transportation needs     Medical: Not on file     Non-medical: Not on file   Tobacco Use    Smoking status: Current Every Day Smoker     Packs/day: 0.50     Years: 20.00     Pack years: 10.00     Types: Cigarettes    Smokeless tobacco: Never Used   Substance and Sexual Activity    Alcohol use: Yes     Alcohol/week: 1.0 standard drinks     Types: 1 Shots of liquor per week     Comment: social    Drug use: Yes     Types: Marijuana, Cocaine     Comment: pt states it helps     Sexual activity: Yes   Lifestyle    Physical activity     Days per week: Not on file     Minutes per session: Not on file    Stress: Not on file   Relationships    Social connections     Talks on phone: Not on file     Gets together: Not on file     Attends Latter day service: Not on file     Active member of club or organization: Not on file     Attends meetings of clubs or organizations: Not on file     Relationship status: Not on file    Intimate partner violence     Fear of current or ex partner: Not on file     Emotionally abused: Not on file     Physically abused: Not on file     Forced sexual activity: Not on file   Other Topics Concern    Not on file   Social History Narrative    Not on file       Family History:       Problem Relation Age of Onset    Breast Cancer Mother     Other Father         GSW       Allergies:  Pcn [penicillins] and Novocain [procaine]    Home Medications:  Prior to Admission medications    Medication Sig Start Date End Date Taking? Authorizing Provider   acetaminophen (TYLENOL) 500 MG tablet Take 2 tablets by mouth 3 times daily for 7 days 9/22/20 9/29/20  Aretha Stephens MD   ibuprofen (IBU) 800 MG tablet Take 1 tablet by mouth every 8 hours as needed for Pain 9/22/20 9/29/20  Aretha Stephens MD   ondansetron [facility]) 4 MG tablet Take 1 tablet by mouth every 8 hours as needed for Nausea 9/22/20   Aretha Stephens MD   albuterol sulfate HFA (PROVENTIL HFA) 108 (90 Base) MCG/ACT inhaler Inhale 1-2 puffs into the lungs every 4 hours as needed for Wheezing or Shortness of Breath (Space out to every 6 hours as symptoms improve) Space out to every 6 hours as symptoms improve.  9/22/20   Aretha Stephens MD dextromethorphan (DELSYM) 30 MG/5ML extended release liquid Take 10 mLs by mouth 2 times daily as needed for Cough 9/22/20 10/2/20  Farrah Mena MD       Current Medications:   No current facility-administered medications for this visit. PHYSICAL EXAM:       /88 (Site: Right Upper Arm, Position: Sitting, Cuff Size: Medium Adult)   Pulse 79   Wt 180 lb (81.6 kg) Comment: verbal  BMI 25.10 kg/m²   Physical Exam     Gen: NAD  HEENT: moist mucus membranes  Cardio: RRR  Pulm: chest rise symmetrically  GI: abd soft  Ext: no edema  Skin: warm    Neuro:    AOX3  CN 2-12 grossly intact  Speech articulate  Motor 5/5  No pronator drift  Sensation symmetrical   SLR negative        Radiology Review:  MRI 8/2019  Foraminal narrowing, mild to moderate at bilateral L4-5, mild at left L3-4    and bilateral L5-S1, minimal at right L2-3.           ASSESSMENT AND PLAN:       Patient Active Problem List   Diagnosis    Schizoaffective disorder, bipolar type ([address]    Polysubstance abuse ([address]    Suicidal ideation    Abdominal pain    Dehydration, moderate    Duodenal ulcer    Pain of upper abdomen    Chronic low back pain    Acute pain of left knee         A/P:  This is a 50 y.o. male with lumbar spondylosis with mild L4/5 forminal stenosis with back pain and some radicular type symptoms    EMG was positive for peripheral neuropathy    I think it is worse while to try epidural injection despite negative EMG    I will refer him to neurology      Brisa Perez DO     Board Certified Neurosurgeon  9/14/2020

## 2020-09-24 NOTE — PROGRESS NOTES
[address] [place] [address], [facility], [facility], [facility], [address] [place] [address]  P: [phone]  F: [phone]    NEUROLOGY CLINIC NOTE     PATIENT NAME: Yusuf Adhikari  PATIENT MRN: [id number]  PRIMARY CARE PHYSICIAN: Joseph Campos MD    HPI:      Yusuf Adhikari is a 50 y.o. right handed  male with PMH significant for chronic back pain, COPD, depression, peripheral artery disease, tobacco abuse, polysubstance abuse (cocaine, marijuana), schizoaffective disorder, left ulnar tunnel release was seen in the clinic for chronic back pain, radicular symptoms and peripheral neuropathy. History obtained from Patient and medical chart review. Patient has a history of chronic back pain with radicular symptoms in the right lower extremity. As per patient his symptoms started when the fridge door fell on his back while he was incarcerated. He endorses chronic back pain, sharp shooting in nature radiating to the right lower extremity associated with constant tingling and numbness in her right leg involving his right foot and toes. He endorses weakness in the right lower extremity and difficulty with ambulation secondary to the weakness. Denies any perineal numbness or saddle anesthesia. Denies any problem with bowel bladder function. Denies any pain or radicular symptoms in the left lower extremity. He denies any left lower extremity weakness. Denies any neck pain or radicular symptoms of pain or paresthesias in bilateral upper extremities. He had MRI of the lumbar spine done in August 2019 which showed degenerative disc disease, foraminal narrowing mild to moderate at bilateral L4-L5, mild at L3-L4 and bilateral L5-S1, minimal at right L2-L3. He was seen by neurosurgeon in May 2020, EMG was ordered which was done on 8/13/2020 showed mild sensorimotor neuropathy affecting the bilateral lower extremities, no evidence for lumbosacral radiculopathy.   Patient was hence referred to neurology for mild sensorimotor neuropathy. Patient follows up with pain management, has not yet received any epidural injections. Initially he was scared concerning that injections will worsen his situation. But saw neurosurgery, recommended epidural injections. PATIENT HISTORY:     Past Medical History:   Diagnosis Date    Bipolar 1 disorder ([facility] [address]     COPD (chronic obstructive pulmonary disease) ([facility] [address]     Depression     Disc herniation     Duodenal ulcer 2015    [facility]     Tobacco abuse         Past Surgical History:   Procedure Laterality Date    CARDIAC CATHETERIZATION  11/07/2016    ULNAR TUNNEL RELEASE Left     UPPER GASTROINTESTINAL ENDOSCOPY  11/09/15    duodenal ulcer    UPPER GASTROINTESTINAL ENDOSCOPY  1/26/16        Social History     Socioeconomic History    Marital status:      Spouse name: Not on file    Number of children: Not on file    Years of education: Not on file    Highest education level: Not on file   Occupational History    Not on file   Social Needs    Financial resource strain: Not on file    Food insecurity     Worry: Not on file     Inability: Not on file   [employer] needs     Medical: Not on file     Non-medical: Not on file   Tobacco Use    Smoking status: Current Every Day Smoker     Packs/day: 0.50     Years: 20.00     Pack years: 10.00     Types: Cigarettes    Smokeless tobacco: Never Used   Substance and Sexual Activity    Alcohol use:  Yes     Alcohol/week: 1.0 standard drinks     Types: 1 Shots of liquor per week     Comment: social    Drug use: Yes     Types: Marijuana, Cocaine     Comment: pt states it helps     Sexual activity: Yes   Lifestyle    Physical activity     Days per week: Not on file     Minutes per session: Not on file    Stress: Not on file   Relationships    Social connections     Talks on phone: Not on file     Gets together: Not on file     Attends Orthodox service: Musculoskeletal: Positive for back pain and gait problem. Skin: Negative for pallor and rash. Neurological: Positive for weakness and numbness. Negative for dizziness, tremors, seizures, syncope, facial asymmetry, speech difficulty, light-headedness and headaches. Psychiatric/Behavioral: Positive for dysphoric mood and sleep disturbance. Negative for behavioral problems and hallucinations. VITALS  /88 (Site: Left Upper Arm, Position: Sitting, Cuff Size: Medium Adult)   Pulse 79   Temp 97.1 °F (36.2 °C) (Temporal)   Ht 5' 11\" (1.803 m)   Wt 180 lb (81.6 kg)   SpO2 99%   BMI 25.10 kg/m²      PHYSICAL EXAMINATION:     Constitutional: Well developed, well nourished and in no acute distress. Head:  normocephalic, atraumatic. Neck: supple, no carotid bruits, thyroid not palpable  Respiratory: Clear to auscultation bilaterally with no use of accessory muscles during respiration. Cardiovascular: normal rate, regular rhythm, no murmur, gallop, rub.   Abdomen: Soft, nontender, nondistended, normal bowel sounds, no hepatomegaly or splenomegaly  Extremities:  peripheral pulses palpable, no pedal edema or calf pain with palpation  Psych: normal affect      NEUROLOGICAL EXAMINATION:     Mental status   Alert and oriented; intact memory with no confusion, speech or language problems; no hallucinations or delusions     Cranial nerves   II - visual fields intact to confrontation                                                III, IV, VI - extra-ocular muscles full: no pupillary defect; no LUDA, no nystagmus, no ptosis   V - normal facial sensation                                                               VII - normal facial symmetry                                                             VIII - intact hearing                                                                             IX, X - symmetrical palate                                                                  XI - symmetrical

## 2020-11-05 ENCOUNTER — HOSPITAL ENCOUNTER (OUTPATIENT)
Age: 55
Discharge: HOME | End: 2020-11-05
Payer: COMMERCIAL

## 2020-11-05 LAB
C-REACTIVE PROTEIN: <0.3 MG/L (ref 0–5)
CERULOPLASMIN: 20 MG/DL (ref 15–30)
FOLATE: 8.5 NG/ML
SEDIMENTATION RATE, ERYTHROCYTE: 9 MM (ref 0–20)
TSH SERPL DL<=0.05 MIU/L-ACNC: 0.85 MIU/L (ref 0.3–5)
VITAMIN B-12: 444 PG/ML (ref 232–1245)

## 2020-11-05 PROCEDURE — 36415 COLL VENOUS BLD VENIPUNCTURE: CPT

## 2020-11-05 PROCEDURE — 84207 ASSAY OF VITAMIN B-6: CPT

## 2020-11-05 PROCEDURE — 85652 RBC SED RATE AUTOMATED: CPT

## 2020-11-05 PROCEDURE — 84443 ASSAY THYROID STIM HORMONE: CPT

## 2020-11-05 PROCEDURE — 84155 ASSAY OF PROTEIN SERUM: CPT

## 2020-11-05 PROCEDURE — 84166 PROTEIN E-PHORESIS/URINE/CSF: CPT

## 2020-11-05 PROCEDURE — 82607 VITAMIN B-12: CPT

## 2020-11-05 PROCEDURE — 86038 ANTINUCLEAR ANTIBODIES: CPT

## 2020-11-05 PROCEDURE — 82390 ASSAY OF CERULOPLASMIN: CPT

## 2020-11-05 PROCEDURE — 82525 ASSAY OF COPPER: CPT

## 2020-11-05 PROCEDURE — 86140 C-REACTIVE PROTEIN: CPT

## 2020-11-05 PROCEDURE — 82746 ASSAY OF FOLIC ACID SERUM: CPT

## 2020-11-05 PROCEDURE — 84446 ASSAY OF VITAMIN E: CPT

## 2020-11-05 PROCEDURE — 84165 PROTEIN E-PHORESIS SERUM: CPT

## 2020-11-05 PROCEDURE — 84156 ASSAY OF PROTEIN URINE: CPT

## 2020-11-06 LAB
ALBUMIN (CALCULATED): 3.4 G/DL (ref 3.2–5.2)
ALBUMIN PERCENT: 64 % (ref 45–65)
ALPHA 1 PERCENT: 3 % (ref 3–6)
ALPHA 2 PERCENT: 10 % (ref 6–13)
ALPHA-1-GLOBULIN: 0.2 G/DL (ref 0.1–0.4)
ALPHA-2-GLOBULIN: 0.5 G/DL (ref 0.5–0.9)
ANTI-NUCLEAR ANTIBODY (ANA): NEGATIVE
BETA GLOBULIN: 0.5 G/DL (ref 0.5–1.1)
BETA PERCENT: 10 % (ref 11–19)
GAMMA GLOBULIN %: 14 % (ref 9–20)
GAMMA GLOBULIN: 0.7 G/DL (ref 0.5–1.5)
P E INTERPRETATION, U: NORMAL
PATHOLOGIST: ABNORMAL
PATHOLOGIST: NORMAL
PROTEIN ELECTROPHORESIS, SERUM: ABNORMAL
SPECIMEN TYPE: NORMAL
TOTAL PROT. SUM,%: 101 % (ref 98–102)
TOTAL PROT. SUM: 5.3 G/DL (ref 6.3–8.2)
TOTAL PROTEIN: 5.4 G/DL (ref 6.4–8.3)
URINE TOTAL PROTEIN: 5 MG/DL

## 2020-11-08 LAB
ALPHA-TOCOPHEROL: 8 MG/L (ref 5.5–18)
GAMMA-TOCOPHEROL: 1.4 MG/L (ref 0–6)
VITAMIN B6: 27.6 NMOL/L (ref 20–125)

## 2020-11-10 ENCOUNTER — OFFICE VISIT (OUTPATIENT)
Dept: NEUROLOGY | Age: 55
End: 2020-11-10
Payer: COMMERCIAL

## 2020-11-10 ENCOUNTER — HOSPITAL ENCOUNTER (OUTPATIENT)
Age: 55
Discharge: HOME | End: 2020-11-10
Payer: COMMERCIAL

## 2020-11-10 VITALS
TEMPERATURE: 96.2 F | SYSTOLIC BLOOD PRESSURE: 124 MMHG | DIASTOLIC BLOOD PRESSURE: 79 MMHG | HEART RATE: 88 BPM | RESPIRATION RATE: 16 BRPM

## 2020-11-10 LAB
COPPER: 101.5 UG/DL (ref 70–140)
ESTIMATED AVERAGE GLUCOSE: 114 MG/DL
HBA1C MFR BLD: 5.6 % (ref 4–6)

## 2020-11-10 PROCEDURE — G8417 CALC BMI ABV UP PARAM F/U: HCPCS | Performed by: STUDENT IN AN ORGANIZED HEALTH CARE EDUCATION/TRAINING PROGRAM

## 2020-11-10 PROCEDURE — 3017F COLORECTAL CA SCREEN DOC REV: CPT | Performed by: STUDENT IN AN ORGANIZED HEALTH CARE EDUCATION/TRAINING PROGRAM

## 2020-11-10 PROCEDURE — G8484 FLU IMMUNIZE NO ADMIN: HCPCS | Performed by: STUDENT IN AN ORGANIZED HEALTH CARE EDUCATION/TRAINING PROGRAM

## 2020-11-10 PROCEDURE — 83036 HEMOGLOBIN GLYCOSYLATED A1C: CPT

## 2020-11-10 PROCEDURE — 4004F PT TOBACCO SCREEN RCVD TLK: CPT | Performed by: STUDENT IN AN ORGANIZED HEALTH CARE EDUCATION/TRAINING PROGRAM

## 2020-11-10 PROCEDURE — 99214 OFFICE O/P EST MOD 30 MIN: CPT | Performed by: STUDENT IN AN ORGANIZED HEALTH CARE EDUCATION/TRAINING PROGRAM

## 2020-11-10 PROCEDURE — G8427 DOCREV CUR MEDS BY ELIG CLIN: HCPCS | Performed by: STUDENT IN AN ORGANIZED HEALTH CARE EDUCATION/TRAINING PROGRAM

## 2020-11-10 PROCEDURE — 36415 COLL VENOUS BLD VENIPUNCTURE: CPT

## 2020-11-10 RX ORDER — GABAPENTIN 300 MG/1
300 CAPSULE ORAL 2 TIMES DAILY
Qty: 60 CAPSULE | Refills: 3 | Status: SHIPPED | OUTPATIENT
Start: 2020-11-10 | End: 2021-05-04

## 2020-11-10 ASSESSMENT — ENCOUNTER SYMPTOMS
DIARRHEA: 0
BACK PAIN: 1
ABDOMINAL PAIN: 0
CONSTIPATION: 0
PHOTOPHOBIA: 0
EYE PAIN: 0
NAUSEA: 0
SHORTNESS OF BREATH: 0
VOMITING: 0
COUGH: 0

## 2020-11-10 NOTE — PROGRESS NOTES
[address] [place] [address], [facility], [facility], [address] [facility], [address] [place] [address]  P: [phone]  F: [phone]    NEUROLOGY CLINIC NOTE     PATIENT NAME: Kasi Nickerson  PATIENT MRN: [id number]  PRIMARY CARE PHYSICIAN: Kimber Carmen MD        Interval History: 11/10/2020   Patient was last seen in the clinic in September 2020. Since last visit, patient continues to complain of chronic back pain radiating to the right lower extremity. He endorses sharp shooting pain in the back radiating to the right leg. Endorses constant pain and paresthesias in the right lower extremity. Endorses weakness on exertion. Denies any problem with bowel or bladder function. Denies any perineal numbness or saddle anesthesia. Denies any improvement in his symptoms since last visit. Denies any falls. He was supposed to follow-up with pain management for epidural injections, patient is scared for the injections, considering the complications and side effects from the procedure. He does not want to pursue the procedure for now. He has tried gabapentin in the past, does not remember the dose of the medication tried. Would like to try it again. As per patient smoking marijuana is the only thing that is helping with the pain at present. Denies any other new neurologic concerns during this visit.     11/5/2020  Vitamin E 8  Vitamin B6 27.6  Vitamin B12 444  Folate 8.5  TSH 0.85  ESR 9  CRP less than 0.3  SPEP-slightly decreased total protein with normal electrophoretic pattern  UPEP-normal electrophoretic pattern  Serum copper 101.5-normal  Ceruloplasmin 20  MARANDA negative        Notes from 9/24/20  HPI:      Kasi Nickerson is a 50 y.o. right handed  male with PMH significant for chronic back pain, COPD, depression, peripheral artery disease, tobacco abuse, polysubstance abuse (cocaine, marijuana), schizoaffective disorder, left ulnar tunnel release was seen in the clinic for chronic back pain, radicular symptoms and peripheral neuropathy. History obtained from Patient and medical chart review. Patient has a history of chronic back pain with radicular symptoms in the right lower extremity. As per patient his symptoms started when the fridge door fell on his back while he was incarcerated. He endorses chronic back pain, sharp shooting in nature radiating to the right lower extremity associated with constant tingling and numbness in her right leg involving his right foot and toes. He endorses weakness in the right lower extremity and difficulty with ambulation secondary to the weakness. Denies any perineal numbness or saddle anesthesia. Denies any problem with bowel bladder function. Denies any pain or radicular symptoms in the left lower extremity. He denies any left lower extremity weakness. Denies any neck pain or radicular symptoms of pain or paresthesias in bilateral upper extremities. He had MRI of the lumbar spine done in August 2019 which showed degenerative disc disease, foraminal narrowing mild to moderate at bilateral L4-L5, mild at L3-L4 and bilateral L5-S1, minimal at right L2-L3. He was seen by neurosurgeon in May 2020, EMG was ordered which was done on 8/13/2020 showed mild sensorimotor neuropathy affecting the bilateral lower extremities, no evidence for lumbosacral radiculopathy. Patient was hence referred to neurology for mild sensorimotor neuropathy. Patient follows up with pain management, has not yet received any epidural injections. Initially he was scared concerning that injections will worsen his situation. But saw neurosurgery, recommended epidural injections.         PATIENT HISTORY:     Past Medical History:   Diagnosis Date    Bipolar 1 [facility] ([facility]     COPD (chronic obstructive pulmonary disease) ([facility]     Depression     Disc herniation     Duodenal ulcer 2015    [facility])     Tobacco abuse         Past Surgical History:   Procedure Laterality Date    CARDIAC CATHETERIZATION  11/07/2016    ULNAR TUNNEL RELEASE Left     UPPER GASTROINTESTINAL ENDOSCOPY  11/09/15    duodenal ulcer    UPPER GASTROINTESTINAL ENDOSCOPY  1/26/16        Social History     Socioeconomic History    Marital status:      Spouse name: Not on file    Number of children: Not on file    Years of education: Not on file    Highest education level: Not on file   Occupational History    Not on file   Social Needs    Financial resource strain: Not on file    Food insecurity     Worry: Not on file     Inability: Not on file   [employer] needs     Medical: Not on file     Non-medical: Not on file   Tobacco Use    Smoking status: Current Every Day Smoker     Packs/day: 0.50     Years: 20.00     Pack years: 10.00     Types: Cigarettes    Smokeless tobacco: Never Used   Substance and Sexual Activity    Alcohol use:  Yes     Alcohol/week: 1.0 standard drinks     Types: 1 Shots of liquor per week     Comment: social    Drug use: Yes     Types: Marijuana, Cocaine     Comment: pt states it helps     Sexual activity: Yes   Lifestyle    Physical activity     Days per week: Not on file     Minutes per session: Not on file    Stress: Not on file   Relationships    Social connections     Talks on phone: Not on file     Gets together: Not on file     Attends Restorationism service: Not on file     Active member of club or organization: Not on file     Attends meetings of clubs or organizations: Not on file     Relationship status: Not on file    Intimate partner violence     Fear of current or ex partner: Not on file     Emotionally abused: Not on file     Physically abused: Not on file     Forced sexual activity: Not on file   Other Topics Concern    Not on file   Social History Narrative    Not on file        Current Outpatient Medications   Medication Sig Dispense Refill    gabapentin (NEURONTIN) 300 MG capsule Take 1 capsule by Right Upper Arm, Position: Sitting, Cuff Size: Medium Adult)   Pulse 88   Temp 96.2 °F (35.7 °C)   Resp 16      PHYSICAL EXAMINATION:     Constitutional: Well developed, well nourished and in no acute distress. Head:  normocephalic, atraumatic. Neck: supple, no carotid bruits, thyroid not palpable  Respiratory: Clear to auscultation bilaterally with no use of accessory muscles during respiration. Cardiovascular: normal rate, regular rhythm, no murmur, gallop, rub. Abdomen: Soft, nontender, nondistended, normal bowel sounds, no hepatomegaly or splenomegaly  Extremities:  peripheral pulses palpable, no pedal edema or calf pain with palpation  Psych: normal affect      NEUROLOGICAL EXAMINATION:     Mental status   Alert and oriented; intact memory with no confusion, speech or language problems; no hallucinations or delusions     Cranial nerves   II - visual fields intact to confrontation                                                III, IV, VI - extra-ocular muscles full: no pupillary defect; no LUDA, no nystagmus, no ptosis   V - normal facial sensation                                                               VII - normal facial symmetry                                                             VIII - intact hearing                                                                             IX, X - symmetrical palate                                                                  XI - symmetrical shoulder shrug                                                       XII - midline tongue without atrophy or fasciculation     Motor function  Normal muscle bulk and tone  Muscle strength: normal power 5/5     Sensory function  intact to light touch and pinprick in bilateral upper and lower extremities. Cerebellar No involuntary movements or tremors     Reflex function Intact 2+ DTR and symmetric. Gait                   antalgic gait due to pain in the right lower extremity.            PRIOR TESTS AND IMAGING: Following images and Labs were reviewed by the examiner     MRI lumbar spine without contrast 8/10/2019  FINDINGS:    BONES/ALIGNMENT: There is normal alignment of the lumbar spine.  The    vertebral body heights are maintained. The bone marrow signal appears    unremarkable.  There is no fracture or destructive osseous lesion.  There is    degenerative disc disease with disc desiccation, mild disc space narrowing    and mild endplate changes.         SPINAL CORD: The conus terminates normally.         SOFT TISSUES: No paraspinal mass identified.         L1-L2: There is disc bulge without spinal canal or foraminal stenosis.         L2-L3: There is disc bulge with minimal right foraminal narrowing.  No spinal    canal narrowing.         L3-L4: There is disc bulge with mild left foraminal narrowing.  No spinal    canal narrowing.         L4-L5: There is disc bulge with mild-to-moderate bilateral foraminal    narrowing.  No spinal canal narrowing.         L5-S1: There is disc bulge with mild bilateral foraminal narrowing.  No    spinal canal narrowing.              Impression    Degenerative disc disease as described above, without spinal canal narrowing.         Foraminal narrowing, mild to moderate at bilateral L4-5, mild at left L3-4    and bilateral L5-S1, minimal at right L2-3. EMG nerve conduction studies bilateral lower extremities 8/13/2020  \"mild sensorimotor   neuropathy affecting the lower extremities. Da Luke is no evidence for   lumbosacral radiculopathy    ASSESSMENT / PLAN:     Sonam Patricia is a 50 y.o. right handed  male  was seen in the clinic for chronic back pain, radicular symptoms and peripheral neuropathy.     · Mild sensorimotor neuropathy in bilateral lower extremities - EMG on  8/13/2020   · History of chronic back pain with radicular symptoms in the right lower extremity for last 2 years  · History of polysubstance abuse-cocaine marijuana  · History of left ulnar tunnel release  · Bipolar disorder  · COPD  · Schizoaffective disorder  · Occasional alcohol use      11/5/2020  Vitamin E 8  Vitamin B6 27.6  Vitamin B12 444  Folate 8.5  TSH 0.85  ESR 9  CRP less than 0.3  SPEP-slightly decreased total protein with normal electrophoretic pattern  UPEP-normal electrophoretic pattern  Serum copper 101.5-normal  Ceruloplasmin 20  AMRANDA negative    PLAN:   -Checked vitamin B12, folate, TSH, SPEP, UPEP, vitamin B6, vitamin E, serum copper, ceruloplasmin level, MARANDA, ESR as above  - Ordered HbA1c    -We will give a trial of gabapentin for pain and paresthesias. Take 300 mg at nighttime for 1 week, can increase to 300 mg twice a day after that. Discussed possible side effects of sedation, drowsiness and dizziness, ataxia with the patient. Instructed patient to call the clinic if develop any of the possible side effects.      -Patient has been evaluated by neurosurgery, recommended follow-up with the pain management  -Follow-up with pain management for epidural injections and pain medications (considering history of polysubstance abuse)    -PT/OT    - Follow up in the clinic in 6 to 8 weeks  - Instructed patient to call the clinic if symptoms worsen or develop any new symptoms. I have spent 25 minutes  with the patient more than 50% of this time was spent reviewing medical records, discussing imaging findings, counseling regarding medications side effects and compliance, healthy habits and coordinating care.         Electronically signed by Jayme Lindsay MD on 11/10/2020 at 9:44 AM